# Patient Record
Sex: MALE | Race: WHITE | Employment: FULL TIME | URBAN - METROPOLITAN AREA
[De-identification: names, ages, dates, MRNs, and addresses within clinical notes are randomized per-mention and may not be internally consistent; named-entity substitution may affect disease eponyms.]

---

## 2018-08-08 LAB
CHOLESTEROL, TOTAL: 174 MG/DL
CHOLESTEROL/HDL RATIO: NORMAL
HDLC SERPL-MCNC: 48 MG/DL (ref 35–70)
LDL CHOLESTEROL CALCULATED: 106 MG/DL (ref 0–160)
TRIGL SERPL-MCNC: 101 MG/DL
VLDLC SERPL CALC-MCNC: 20 MG/DL

## 2019-08-20 RX ORDER — OMEPRAZOLE 40 MG/1
40 CAPSULE, DELAYED RELEASE ORAL DAILY
COMMUNITY
End: 2019-09-30 | Stop reason: SDUPTHER

## 2019-09-30 ENCOUNTER — OFFICE VISIT (OUTPATIENT)
Dept: FAMILY MEDICINE CLINIC | Age: 39
End: 2019-09-30
Payer: COMMERCIAL

## 2019-09-30 VITALS
WEIGHT: 249.2 LBS | DIASTOLIC BLOOD PRESSURE: 70 MMHG | BODY MASS INDEX: 34.89 KG/M2 | HEART RATE: 80 BPM | SYSTOLIC BLOOD PRESSURE: 115 MMHG | HEIGHT: 71 IN

## 2019-09-30 DIAGNOSIS — G43.809 OTHER MIGRAINE WITHOUT STATUS MIGRAINOSUS, NOT INTRACTABLE: ICD-10-CM

## 2019-09-30 DIAGNOSIS — K21.9 GASTROESOPHAGEAL REFLUX DISEASE WITHOUT ESOPHAGITIS: Primary | ICD-10-CM

## 2019-09-30 DIAGNOSIS — F41.9 ANXIETY: ICD-10-CM

## 2019-09-30 DIAGNOSIS — D18.01 ANGIOMA OF SKIN: ICD-10-CM

## 2019-09-30 PROBLEM — G43.909 MIGRAINE WITHOUT STATUS MIGRAINOSUS, NOT INTRACTABLE: Status: ACTIVE | Noted: 2019-09-30

## 2019-09-30 PROCEDURE — 99213 OFFICE O/P EST LOW 20 MIN: CPT | Performed by: FAMILY MEDICINE

## 2019-09-30 RX ORDER — OMEPRAZOLE 40 MG/1
40 CAPSULE, DELAYED RELEASE ORAL DAILY
Qty: 90 CAPSULE | Refills: 1 | Status: SHIPPED | OUTPATIENT
Start: 2019-09-30 | End: 2020-06-23 | Stop reason: SDUPTHER

## 2019-09-30 ASSESSMENT — ENCOUNTER SYMPTOMS
CHEST TIGHTNESS: 0
ABDOMINAL PAIN: 0
SHORTNESS OF BREATH: 0

## 2019-09-30 ASSESSMENT — PATIENT HEALTH QUESTIONNAIRE - PHQ9
SUM OF ALL RESPONSES TO PHQ QUESTIONS 1-9: 0
1. LITTLE INTEREST OR PLEASURE IN DOING THINGS: 0
2. FEELING DOWN, DEPRESSED OR HOPELESS: 0
SUM OF ALL RESPONSES TO PHQ QUESTIONS 1-9: 0
SUM OF ALL RESPONSES TO PHQ9 QUESTIONS 1 & 2: 0

## 2019-09-30 NOTE — PROGRESS NOTES
ALLERGIES  Allergies   Allergen Reactions    Bactrim [Sulfamethoxazole-Trimethoprim]     Sulfa Antibiotics Other (See Comments)     Doesn't recall reaction but had to be seen in ED       PHYSICAL EXAM    /70 (Site: Left Upper Arm, Position: Sitting, Cuff Size: Large Adult)   Pulse 80   Ht 5' 11\" (1.803 m)   Wt 249 lb 3.2 oz (113 kg)   BMI 34.76 kg/m²     Physical Exam   Constitutional: He is oriented to person, place, and time. He appears well-developed and well-nourished. HENT:   Head: Normocephalic and atraumatic. Eyes: Pupils are equal, round, and reactive to light. EOM are normal.   Neck: Normal range of motion. Neck supple. Cardiovascular: Normal rate, regular rhythm and normal heart sounds. Pulmonary/Chest: Effort normal and breath sounds normal.   Abdominal: Soft. Musculoskeletal: Normal range of motion. Neurological: He is alert and oriented to person, place, and time. Skin: Skin is warm and dry. With angioma left flank certainly benign-appearing stop bleeding I will think it likely to rub and I think does not need to be excised unless has symptoms he agrees with this approach   Psychiatric: He has a normal mood and affect. Thought content normal.   Nursing note and vitals reviewed. ASSESSMENT & PLAN     Diagnosis Orders   1. Gastroesophageal reflux disease without esophagitis     2. Anxiety     3. Other migraine without status migrainosus, not intractable     4. Angioma of skin     At this point we discussed situation no treatment needed simply provide refills on meds as he needs. Continue with healthy lifestyle watch weight and increase exercise. No antidepressant needed -migraine and ophthalmologic symptoms are stable. Continue to work on antireflux measures and take his PPI. We discussed a mole and no intervention at this point. Mole is really an angioma. Return in about 6 months (around 3/30/2020).          Electronically signed by Lee Ann Adan MD on

## 2020-03-24 ENCOUNTER — TELEPHONE (OUTPATIENT)
Dept: INTERNAL MEDICINE CLINIC | Age: 40
End: 2020-03-24

## 2020-06-23 ENCOUNTER — OFFICE VISIT (OUTPATIENT)
Dept: FAMILY MEDICINE CLINIC | Age: 40
End: 2020-06-23
Payer: COMMERCIAL

## 2020-06-23 VITALS
SYSTOLIC BLOOD PRESSURE: 122 MMHG | HEART RATE: 66 BPM | HEIGHT: 71 IN | OXYGEN SATURATION: 97 % | BODY MASS INDEX: 34.24 KG/M2 | TEMPERATURE: 97.8 F | WEIGHT: 244.6 LBS | DIASTOLIC BLOOD PRESSURE: 80 MMHG

## 2020-06-23 PROBLEM — F41.9 ANXIETY: Status: RESOLVED | Noted: 2019-09-30 | Resolved: 2020-06-23

## 2020-06-23 PROCEDURE — 99213 OFFICE O/P EST LOW 20 MIN: CPT | Performed by: PHYSICIAN ASSISTANT

## 2020-06-23 RX ORDER — OMEPRAZOLE 40 MG/1
40 CAPSULE, DELAYED RELEASE ORAL DAILY
Qty: 90 CAPSULE | Refills: 1 | Status: SHIPPED | OUTPATIENT
Start: 2020-06-23 | End: 2021-03-26 | Stop reason: SDUPTHER

## 2020-06-23 ASSESSMENT — ENCOUNTER SYMPTOMS
SORE THROAT: 0
NAUSEA: 0
EYE PAIN: 0
DIARRHEA: 0
COUGH: 0
ABDOMINAL PAIN: 0
VOMITING: 0
RHINORRHEA: 0
BLOOD IN STOOL: 0
SHORTNESS OF BREATH: 0
CONSTIPATION: 0

## 2020-06-23 ASSESSMENT — PATIENT HEALTH QUESTIONNAIRE - PHQ9
1. LITTLE INTEREST OR PLEASURE IN DOING THINGS: 0
SUM OF ALL RESPONSES TO PHQ9 QUESTIONS 1 & 2: 0
SUM OF ALL RESPONSES TO PHQ QUESTIONS 1-9: 0
SUM OF ALL RESPONSES TO PHQ QUESTIONS 1-9: 0
2. FEELING DOWN, DEPRESSED OR HOPELESS: 0

## 2020-06-23 NOTE — PROGRESS NOTES
Cardiovascular:      Rate and Rhythm: Normal rate and regular rhythm. Heart sounds: No murmur. No friction rub. No gallop. Pulmonary:      Effort: Pulmonary effort is normal.      Breath sounds: Normal breath sounds. Musculoskeletal:      Right lower leg: No edema. Left lower leg: No edema. Skin:     General: Skin is warm and dry. Neurological:      Mental Status: He is alert and oriented to person, place, and time. Comments: Cranial nerves II-XII grossly intact   Psychiatric:         Mood and Affect: Mood normal.         Behavior: Behavior normal.         ASSESSMENT & PLAN  1. Gastroesophageal reflux disease without esophagitis  Well controlled. Refilled medication. - omeprazole (PRILOSEC) 40 MG delayed release capsule; Take 1 capsule by mouth daily  Dispense: 90 capsule; Refill: 1          No follow-ups on file.             Electronically signed by Noel Feliciano PA-C on 6/23/2020

## 2021-03-26 ENCOUNTER — OFFICE VISIT (OUTPATIENT)
Dept: FAMILY MEDICINE CLINIC | Age: 41
End: 2021-03-26
Payer: COMMERCIAL

## 2021-03-26 VITALS
DIASTOLIC BLOOD PRESSURE: 92 MMHG | SYSTOLIC BLOOD PRESSURE: 134 MMHG | WEIGHT: 248 LBS | TEMPERATURE: 97.2 F | HEART RATE: 76 BPM | HEIGHT: 71 IN | BODY MASS INDEX: 34.72 KG/M2

## 2021-03-26 DIAGNOSIS — Z13.220 LIPID SCREENING: ICD-10-CM

## 2021-03-26 DIAGNOSIS — R39.15 URINARY URGENCY: ICD-10-CM

## 2021-03-26 DIAGNOSIS — Z00.00 WELL ADULT HEALTH CHECK: ICD-10-CM

## 2021-03-26 DIAGNOSIS — Z00.00 WELL ADULT HEALTH CHECK: Primary | ICD-10-CM

## 2021-03-26 DIAGNOSIS — K21.9 GASTROESOPHAGEAL REFLUX DISEASE WITHOUT ESOPHAGITIS: ICD-10-CM

## 2021-03-26 LAB
A/G RATIO: 1.6 (ref 1.1–2.2)
ALBUMIN SERPL-MCNC: 4.8 G/DL (ref 3.4–5)
ALP BLD-CCNC: 72 U/L (ref 40–129)
ALT SERPL-CCNC: 55 U/L (ref 10–40)
ANION GAP SERPL CALCULATED.3IONS-SCNC: 7 MMOL/L (ref 3–16)
AST SERPL-CCNC: 29 U/L (ref 15–37)
BILIRUB SERPL-MCNC: 0.4 MG/DL (ref 0–1)
BUN BLDV-MCNC: 11 MG/DL (ref 7–20)
CALCIUM SERPL-MCNC: 9.7 MG/DL (ref 8.3–10.6)
CHLORIDE BLD-SCNC: 102 MMOL/L (ref 99–110)
CHOLESTEROL, TOTAL: 181 MG/DL (ref 0–199)
CO2: 29 MMOL/L (ref 21–32)
CREAT SERPL-MCNC: 0.9 MG/DL (ref 0.9–1.3)
GFR AFRICAN AMERICAN: >60
GFR NON-AFRICAN AMERICAN: >60
GLOBULIN: 3 G/DL
GLUCOSE BLD-MCNC: 86 MG/DL (ref 70–99)
HCT VFR BLD CALC: 50.2 % (ref 40.5–52.5)
HDLC SERPL-MCNC: 43 MG/DL (ref 40–60)
HEMOGLOBIN: 16.8 G/DL (ref 13.5–17.5)
LDL CHOLESTEROL CALCULATED: 111 MG/DL
MCH RBC QN AUTO: 31.1 PG (ref 26–34)
MCHC RBC AUTO-ENTMCNC: 33.6 G/DL (ref 31–36)
MCV RBC AUTO: 92.5 FL (ref 80–100)
PDW BLD-RTO: 13.7 % (ref 12.4–15.4)
PLATELET # BLD: 262 K/UL (ref 135–450)
PMV BLD AUTO: 8.6 FL (ref 5–10.5)
POTASSIUM SERPL-SCNC: 4.6 MMOL/L (ref 3.5–5.1)
RBC # BLD: 5.42 M/UL (ref 4.2–5.9)
SODIUM BLD-SCNC: 138 MMOL/L (ref 136–145)
TOTAL PROTEIN: 7.8 G/DL (ref 6.4–8.2)
TRIGL SERPL-MCNC: 133 MG/DL (ref 0–150)
TSH REFLEX: 1.13 UIU/ML (ref 0.27–4.2)
VLDLC SERPL CALC-MCNC: 27 MG/DL
WBC # BLD: 7.7 K/UL (ref 4–11)

## 2021-03-26 PROCEDURE — 99396 PREV VISIT EST AGE 40-64: CPT | Performed by: FAMILY MEDICINE

## 2021-03-26 PROCEDURE — 81002 URINALYSIS NONAUTO W/O SCOPE: CPT | Performed by: FAMILY MEDICINE

## 2021-03-26 RX ORDER — OMEPRAZOLE 40 MG/1
40 CAPSULE, DELAYED RELEASE ORAL DAILY
Qty: 90 CAPSULE | Refills: 1 | Status: SHIPPED | OUTPATIENT
Start: 2021-03-26 | End: 2021-07-30 | Stop reason: SDUPTHER

## 2021-03-26 ASSESSMENT — ENCOUNTER SYMPTOMS
NAUSEA: 0
SHORTNESS OF BREATH: 0
TROUBLE SWALLOWING: 0
VOMITING: 0
BLOOD IN STOOL: 0
ABDOMINAL PAIN: 0
DIARRHEA: 0
WHEEZING: 0
EYE PAIN: 0
CHEST TIGHTNESS: 0

## 2021-03-26 ASSESSMENT — PATIENT HEALTH QUESTIONNAIRE - PHQ9
SUM OF ALL RESPONSES TO PHQ QUESTIONS 1-9: 0
1. LITTLE INTEREST OR PLEASURE IN DOING THINGS: 0
SUM OF ALL RESPONSES TO PHQ9 QUESTIONS 1 & 2: 0
SUM OF ALL RESPONSES TO PHQ QUESTIONS 1-9: 0

## 2021-03-26 NOTE — PROGRESS NOTES
3/26/2021    [facility]    Chief Complaint   Patient presents with    Annual Exam    Urinary Frequency     urinary frequency, bladder pressure, x1 month       HPI  History was obtained from the patient. Mazrena Mcpherson is a 35 y.o. male who presents today with routine physical for well check. States he needs to work on little bit of weight loss and increase in exercise otherwise been feeling fine GERD symptoms been okay with diet changes and medication. Migraine headaches have not been an issue. Only real issue is he is having some suprapubic pressure symptoms increased frequency no real dysuria no fever no chills. Denies other intercurrent issues or problems. Has not had Covid vaccine yet and is due for labs. [place] REVIEW OF SYMPTOMS    Review of Systems   Constitutional: Negative for activity change, fatigue and fever. HENT: Negative for congestion, hearing loss, mouth sores and trouble swallowing. Eyes: Negative for pain and visual disturbance. Respiratory: Negative for chest tightness, shortness of breath and wheezing. Cardiovascular: Negative for chest pain and palpitations. Gastrointestinal: Negative for abdominal pain, blood in stool, diarrhea, nausea and vomiting. Endocrine: Negative for cold intolerance, polydipsia and polyuria. Genitourinary: Positive for frequency. Negative for difficulty urinating, dysuria and urgency. Musculoskeletal: Negative for arthralgias, gait problem and neck stiffness. Skin: Negative for rash. Allergic/Immunologic: Negative for environmental allergies. Neurological: Positive for headaches. Negative for dizziness, seizures, speech difficulty and weakness. Hematological: Does not bruise/bleed easily. Psychiatric/Behavioral: Negative for agitation, confusion, hallucinations and suicidal ideas. The patient is not nervous/anxious.         PAST MEDICAL HISTORY  Past Medical History:   Diagnosis Date    GERD (gastroesophageal reflux disease)        FAMILY HISTORY  No family history on file. SOCIAL HISTORY  Social History     Socioeconomic History    Marital status:      Spouse name: None    Number of children: None    Years of education: None    Highest education level: None   Occupational History    None   Social Needs    Financial resource strain: None    Food insecurity     Worry: None     Inability: None    Transportation needs     Medical: None     Non-medical: None   Tobacco Use    Smoking status: Never Smoker    Smokeless tobacco: Never Used   Substance and Sexual Activity    Alcohol use: Yes    Drug use: Never    Sexual activity: None   Lifestyle    Physical activity     Days per week: None     Minutes per session: None    Stress: None   Relationships    Social connections     Talks on phone: None     Gets together: None     Attends Yarsanism service: None     Active member of club or organization: None     Attends meetings of clubs or organizations: None     Relationship status: None    Intimate partner violence     Fear of current or ex partner: None     Emotionally abused: None     Physically abused: None     Forced sexual activity: None   Other Topics Concern    None   Social History Narrative    None        SURGICAL HISTORY  Past Surgical History:   Procedure Laterality Date    VASECTOMY  2017                 CURRENT MEDICATIONS  Current Outpatient Medications   Medication Sig Dispense Refill    omeprazole (PRILOSEC) 40 MG delayed release capsule Take 1 capsule by mouth daily 90 capsule 1    mirabegron (MYRBETRIQ) 25 MG TB24 Take 1 tablet by mouth daily 30 tablet 1    Multiple Vitamins-Minerals (MULTIVITAMIN ADULT PO) Take by mouth       No current facility-administered medications for this visit.         ALLERGIES  Allergies   Allergen Reactions    Bactrim [Sulfamethoxazole-Trimethoprim]     Sulfa Antibiotics Other (See Comments)     Doesn't recall reaction but had to be seen in ED       PHYSICAL EXAM    BP (!) 134/92 Pulse 76   Temp 97.2 °F (36.2 °C)   Ht 5' 11\" (1.803 m)   Wt 248 lb (112.5 kg)   BMI 34.59 kg/m²     Physical Exam  Vitals signs and nursing note reviewed. Constitutional:       General: He is not in acute distress. Appearance: He is well-developed. He is not ill-appearing or toxic-appearing. HENT:      Head: Normocephalic and atraumatic. Nose: Nose normal.      Mouth/Throat:      Mouth: Mucous membranes are moist.      Pharynx: Oropharynx is clear. No oropharyngeal exudate. Eyes:      Pupils: Pupils are equal, round, and reactive to light. Neck:      Musculoskeletal: Normal range of motion and neck supple. Cardiovascular:      Rate and Rhythm: Normal rate and regular rhythm. Heart sounds: Normal heart sounds. Pulmonary:      Effort: Pulmonary effort is normal. No respiratory distress. Breath sounds: Normal breath sounds. No wheezing, rhonchi or rales. Abdominal:      Palpations: Abdomen is soft. Tenderness: There is no abdominal tenderness. Musculoskeletal: Normal range of motion. General: No swelling or deformity. Skin:     General: Skin is warm and dry. Neurological:      General: No focal deficit present. Mental Status: He is alert and oriented to person, place, and time. Mental status is at baseline. Cranial Nerves: No cranial nerve deficit. Motor: No weakness. Gait: Gait normal.   Psychiatric:         Mood and Affect: Mood normal.         Behavior: Behavior normal.         Thought Content: Thought content normal.       UA today was negative  ASSESSMENT & PLAN     Diagnosis Orders   1. Well adult health check  CBC    COMPREHENSIVE METABOLIC PANEL    TSH with Reflex   2. Gastroesophageal reflux disease without esophagitis  omeprazole (PRILOSEC) 40 MG delayed release capsule   3. Urinary urgency  POCT Urinalysis no Micro    CBC    COMPREHENSIVE METABOLIC PANEL   4. Lipid screening  LIPID PANEL   Discussion carried out.  Patient continue on low caffeine and low bladder irritant diet. Avoid food or liquids that aggravate his bladder symptoms. Drink plenty of clear liquids otherwise. He is advised to get Covid vaccine. We will check CMP, CBC, TSH, and lipid panel. Follow-up for these results- will provide refills on meds as needed and empirically cover him with Myrbetriq 25 mg daily and observe his bladder symptoms over the next 3 to 4 weeks- he is to let me know how this does for his urinary symptoms. Return if symptoms worsen or fail to improve.          Electronically signed by Morenita Hua MD on 3/26/2021

## 2021-07-30 ENCOUNTER — OFFICE VISIT (OUTPATIENT)
Dept: FAMILY MEDICINE CLINIC | Age: 41
End: 2021-07-30
Payer: COMMERCIAL

## 2021-07-30 VITALS
HEIGHT: 72 IN | RESPIRATION RATE: 22 BRPM | OXYGEN SATURATION: 98 % | WEIGHT: 245 LBS | DIASTOLIC BLOOD PRESSURE: 78 MMHG | SYSTOLIC BLOOD PRESSURE: 121 MMHG | HEART RATE: 62 BPM | BODY MASS INDEX: 33.18 KG/M2

## 2021-07-30 DIAGNOSIS — L98.9 SKIN LESION ON EXAMINATION: ICD-10-CM

## 2021-07-30 DIAGNOSIS — G43.809 OTHER MIGRAINE WITHOUT STATUS MIGRAINOSUS, NOT INTRACTABLE: ICD-10-CM

## 2021-07-30 DIAGNOSIS — R79.89 LFT ELEVATION: ICD-10-CM

## 2021-07-30 DIAGNOSIS — K21.9 GASTROESOPHAGEAL REFLUX DISEASE WITHOUT ESOPHAGITIS: Primary | ICD-10-CM

## 2021-07-30 LAB — ALT SERPL-CCNC: 69 U/L (ref 10–40)

## 2021-07-30 PROCEDURE — 99213 OFFICE O/P EST LOW 20 MIN: CPT | Performed by: FAMILY MEDICINE

## 2021-07-30 RX ORDER — SUMATRIPTAN 100 MG/1
100 TABLET, FILM COATED ORAL
Qty: 9 TABLET | Refills: 5 | Status: SHIPPED | OUTPATIENT
Start: 2021-07-30 | End: 2022-06-30

## 2021-07-30 RX ORDER — OMEPRAZOLE 40 MG/1
40 CAPSULE, DELAYED RELEASE ORAL DAILY
Qty: 90 CAPSULE | Refills: 1 | Status: SHIPPED | OUTPATIENT
Start: 2021-07-30 | End: 2022-04-05

## 2021-07-30 ASSESSMENT — ENCOUNTER SYMPTOMS
VOMITING: 0
EYE PAIN: 0
CHEST TIGHTNESS: 0
SHORTNESS OF BREATH: 0
BLOOD IN STOOL: 0
DIARRHEA: 0
TROUBLE SWALLOWING: 0
WHEEZING: 0
ABDOMINAL PAIN: 0
NAUSEA: 0

## 2021-07-30 NOTE — PROGRESS NOTES
7/30/2021    [facility]    Chief Complaint   Patient presents with    Mole     left side mole        HPI  History was obtained from the patient. Irma Reece is a 35 y.o. male who presents today with follow-up on migraine headaches and GE reflux symptoms. Overall GERD symptoms are controlled but he watches his diet at all and takes his omeprazole. Still having significant migraine headaches although they do not knock him out of commission completely. No focalized neurologic deficits reported. He remains active he had labs in March of this year that were all normal except for mild elevation of ALT. He has had his Covid virus vaccination x2 with [facility]. Erum Almaraz REVIEW OF SYMPTOMS    Review of Systems   Constitutional: Negative for activity change and fatigue. HENT: Negative for congestion, hearing loss, mouth sores and trouble swallowing. Eyes: Negative for pain and visual disturbance. Respiratory: Negative for chest tightness, shortness of breath and wheezing. Cardiovascular: Negative for chest pain and palpitations. Gastrointestinal: Negative for abdominal pain, blood in stool, diarrhea, nausea and vomiting. Genitourinary: Negative for dysuria, frequency and urgency. Musculoskeletal: Negative for arthralgias, gait problem and neck stiffness. Skin: Negative for rash. Patient with slightly irritated red skin nodule lower back probably slightly irritated angioma or skin tag. Certainly appears benign. He would like something done with this and he is due for general skin exam with dermatology. Allergic/Immunologic: Negative for environmental allergies. Neurological: Negative for dizziness, seizures, speech difficulty and weakness. Hematological: Does not bruise/bleed easily. Psychiatric/Behavioral: Negative for agitation, confusion and hallucinations.        PAST MEDICAL HISTORY  Past Medical History:   Diagnosis Date    GERD (gastroesophageal reflux disease)        FAMILY HISTORY  No family history on file. SOCIAL HISTORY  Social History     Socioeconomic History    Marital status:      Spouse name: Not on file    Number of children: Not on file    Years of education: Not on file    Highest education level: Not on file   Occupational History    Not on file   Tobacco Use    Smoking status: Never Smoker    Smokeless tobacco: Never Used   Substance and Sexual Activity    Alcohol use: Yes    Drug use: Never    Sexual activity: Not on file   Other Topics Concern    Not on file   Social History Narrative    Not on file     Social Determinants of Health     Financial Resource Strain:     Difficulty of Paying Living Expenses:    Food Insecurity:     Worried About Running Out of Food in the Last Year:     [address] in the Last Year:    Transportation Needs:     Lack of Transportation (Medical):      Lack of Transportation (Non-Medical):    Physical Activity:     Days of Exercise per Week:     Minutes of Exercise per Session:    Stress:     Feeling of Stress :    Social Connections:     Frequency of Communication with Friends and Family:     Frequency of Social Gatherings with Friends and Family:     Attends Anabaptist Services:     Active Member of Clubs or Organizations:     Attends Club or Organization Meetings:     Marital Status:    Intimate Partner Violence:     Fear of Current or Ex-Partner:     Emotionally Abused:     Physically Abused:     Sexually Abused:         SURGICAL HISTORY  Past Surgical History:   Procedure Laterality Date    VASECTOMY  2017                 CURRENT MEDICATIONS  Current Outpatient Medications   Medication Sig Dispense Refill    SUMAtriptan (IMITREX) 100 MG tablet Take 1 tablet by mouth once as needed for Migraine 9 tablet 5    omeprazole (PRILOSEC) 40 MG delayed release capsule Take 1 capsule by mouth daily 90 capsule 1    Multiple Vitamins-Minerals (MULTIVITAMIN ADULT PO) Take by mouth       No current facility-administered medications for this visit. ALLERGIES  Allergies   Allergen Reactions    Bactrim [Sulfamethoxazole-Trimethoprim]     Sulfa Antibiotics Other (See Comments)     Doesn't recall reaction but had to be seen in ED       PHYSICAL EXAM    /78   Pulse 62   Resp 22   Ht 5' 11.5\" (1.816 m)   Wt 245 lb (111.1 kg)   SpO2 98%   BMI 33.69 kg/m²     Physical Exam  Vitals and nursing note reviewed. Constitutional:       General: He is not in acute distress. Appearance: Normal appearance. He is well-developed. He is not ill-appearing or toxic-appearing. HENT:      Head: Normocephalic and atraumatic. Nose: Nose normal.      Mouth/Throat:      Mouth: Mucous membranes are moist.   Eyes:      Pupils: Pupils are equal, round, and reactive to light. Cardiovascular:      Rate and Rhythm: Regular rhythm. Bradycardia present. Pulses: Normal pulses. Heart sounds: Normal heart sounds. No murmur heard. Pulmonary:      Effort: Pulmonary effort is normal. No respiratory distress. Breath sounds: Normal breath sounds. No wheezing or rales. Abdominal:      Palpations: Abdomen is soft. Musculoskeletal:         General: No deformity or signs of injury. Normal range of motion. Cervical back: Normal range of motion and neck supple. No rigidity. Skin:     General: Skin is warm and dry. Coloration: Skin is not jaundiced. Findings: No bruising. Neurological:      Mental Status: He is alert and oriented to person, place, and time. Mental status is at baseline. Cranial Nerves: No cranial nerve deficit. Motor: No weakness. Gait: Gait normal.   Psychiatric:         Mood and Affect: Mood normal.         Behavior: Behavior normal.         Thought Content: Thought content normal.         ASSESSMENT & PLAN     Diagnosis Orders   1. Gastroesophageal reflux disease without esophagitis  omeprazole (PRILOSEC) 40 MG delayed release capsule   2.  Other migraine without status migrainosus, not intractable     3. LFT elevation  ALT   4. Skin lesion on examination  External Referral To Dermatology   Will make referral to [facility] for skin exam.  We will check an ALT today as had previously been a little bit elevated. He is to work on increase exercise and some moderate weight loss with about 18 to 20 pounds as of initial goal.  Refills on regular meds to be provided. He is to call with questions or problems    Return in about 4 months (around 11/30/2021).          Electronically signed by Lalit Ramirez MD on 7/30/2021

## 2022-04-05 DIAGNOSIS — K21.9 GASTROESOPHAGEAL REFLUX DISEASE WITHOUT ESOPHAGITIS: ICD-10-CM

## 2022-04-05 RX ORDER — OMEPRAZOLE 40 MG/1
CAPSULE, DELAYED RELEASE ORAL
Qty: 90 CAPSULE | Refills: 0 | Status: SHIPPED | OUTPATIENT
Start: 2022-04-05 | End: 2022-06-30 | Stop reason: SDUPTHER

## 2022-06-30 ENCOUNTER — OFFICE VISIT (OUTPATIENT)
Dept: FAMILY MEDICINE CLINIC | Age: 42
End: 2022-06-30
Payer: COMMERCIAL

## 2022-06-30 ENCOUNTER — HOSPITAL ENCOUNTER (OUTPATIENT)
Age: 42
Discharge: HOME | End: 2022-06-30
Payer: COMMERCIAL

## 2022-06-30 ENCOUNTER — HOSPITAL ENCOUNTER (OUTPATIENT)
Dept: GENERAL RADIOLOGY | Age: 42
Discharge: HOME | End: 2022-06-30
Payer: COMMERCIAL

## 2022-06-30 VITALS
RESPIRATION RATE: 22 BRPM | HEIGHT: 72 IN | SYSTOLIC BLOOD PRESSURE: 154 MMHG | WEIGHT: 253.9 LBS | BODY MASS INDEX: 34.39 KG/M2 | OXYGEN SATURATION: 97 % | DIASTOLIC BLOOD PRESSURE: 105 MMHG | HEART RATE: 68 BPM

## 2022-06-30 DIAGNOSIS — I10 PRIMARY HYPERTENSION: ICD-10-CM

## 2022-06-30 DIAGNOSIS — F41.9 ANXIETY: ICD-10-CM

## 2022-06-30 DIAGNOSIS — R05.9 COUGH: ICD-10-CM

## 2022-06-30 DIAGNOSIS — K21.9 GASTROESOPHAGEAL REFLUX DISEASE WITHOUT ESOPHAGITIS: ICD-10-CM

## 2022-06-30 DIAGNOSIS — R05.9 COUGH: Primary | ICD-10-CM

## 2022-06-30 DIAGNOSIS — E66.09 CLASS 1 OBESITY DUE TO EXCESS CALORIES WITH SERIOUS COMORBIDITY AND BODY MASS INDEX (BMI) OF 34.0 TO 34.9 IN ADULT: ICD-10-CM

## 2022-06-30 DIAGNOSIS — G43.809 OTHER MIGRAINE WITHOUT STATUS MIGRAINOSUS, NOT INTRACTABLE: ICD-10-CM

## 2022-06-30 PROBLEM — E66.811 CLASS 1 OBESITY DUE TO EXCESS CALORIES WITH SERIOUS COMORBIDITY AND BODY MASS INDEX (BMI) OF 34.0 TO 34.9 IN ADULT: Status: ACTIVE | Noted: 2022-06-30

## 2022-06-30 LAB
A/G RATIO: 1.5 (ref 1.1–2.2)
ALBUMIN SERPL-MCNC: 4.4 G/DL (ref 3.4–5)
ALP BLD-CCNC: 77 U/L (ref 40–129)
ALT SERPL-CCNC: 109 U/L (ref 10–40)
ANION GAP SERPL CALCULATED.3IONS-SCNC: 11 MMOL/L (ref 3–16)
AST SERPL-CCNC: 50 U/L (ref 15–37)
BILIRUB SERPL-MCNC: 0.4 MG/DL (ref 0–1)
BUN BLDV-MCNC: 13 MG/DL (ref 7–20)
CALCIUM SERPL-MCNC: 9.8 MG/DL (ref 8.3–10.6)
CHLORIDE BLD-SCNC: 105 MMOL/L (ref 99–110)
CO2: 24 MMOL/L (ref 21–32)
CREAT SERPL-MCNC: 1 MG/DL (ref 0.9–1.3)
GFR AFRICAN AMERICAN: >60
GFR NON-AFRICAN AMERICAN: >60
GLUCOSE BLD-MCNC: 104 MG/DL (ref 70–99)
HCT VFR BLD CALC: 46.3 % (ref 40.5–52.5)
HEMOGLOBIN: 15.8 G/DL (ref 13.5–17.5)
MCH RBC QN AUTO: 30.9 PG (ref 26–34)
MCHC RBC AUTO-ENTMCNC: 34.1 G/DL (ref 31–36)
MCV RBC AUTO: 90.6 FL (ref 80–100)
PDW BLD-RTO: 13.3 % (ref 12.4–15.4)
PLATELET # BLD: 218 K/UL (ref 135–450)
PMV BLD AUTO: 8.4 FL (ref 5–10.5)
POTASSIUM SERPL-SCNC: 5 MMOL/L (ref 3.5–5.1)
RBC # BLD: 5.11 M/UL (ref 4.2–5.9)
SODIUM BLD-SCNC: 140 MMOL/L (ref 136–145)
TOTAL PROTEIN: 7.3 G/DL (ref 6.4–8.2)
WBC # BLD: 6.1 K/UL (ref 4–11)

## 2022-06-30 PROCEDURE — 71046 X-RAY EXAM CHEST 2 VIEWS: CPT

## 2022-06-30 PROCEDURE — 99214 OFFICE O/P EST MOD 30 MIN: CPT | Performed by: FAMILY MEDICINE

## 2022-06-30 RX ORDER — BUSPIRONE HYDROCHLORIDE 5 MG/1
5 TABLET ORAL 2 TIMES DAILY
Qty: 60 TABLET | Refills: 2 | Status: SHIPPED | OUTPATIENT
Start: 2022-06-30 | End: 2022-07-30

## 2022-06-30 RX ORDER — OMEPRAZOLE 40 MG/1
CAPSULE, DELAYED RELEASE ORAL
Qty: 90 CAPSULE | Refills: 0 | Status: SHIPPED | OUTPATIENT
Start: 2022-06-30 | End: 2022-08-11 | Stop reason: SDUPTHER

## 2022-06-30 SDOH — ECONOMIC STABILITY: FOOD INSECURITY: WITHIN THE PAST 12 MONTHS, YOU WORRIED THAT YOUR FOOD WOULD RUN OUT BEFORE YOU GOT MONEY TO BUY MORE.: NEVER TRUE

## 2022-06-30 SDOH — ECONOMIC STABILITY: FOOD INSECURITY: WITHIN THE PAST 12 MONTHS, THE FOOD YOU BOUGHT JUST DIDN'T LAST AND YOU DIDN'T HAVE MONEY TO GET MORE.: NEVER TRUE

## 2022-06-30 ASSESSMENT — ENCOUNTER SYMPTOMS
WHEEZING: 0
ABDOMINAL PAIN: 0
NAUSEA: 0
BLOOD IN STOOL: 0
DIARRHEA: 0
SHORTNESS OF BREATH: 0
CHEST TIGHTNESS: 0
VOMITING: 0
EYE PAIN: 0
TROUBLE SWALLOWING: 0
COUGH: 1

## 2022-06-30 ASSESSMENT — PATIENT HEALTH QUESTIONNAIRE - PHQ9
SUM OF ALL RESPONSES TO PHQ9 QUESTIONS 1 & 2: 1
2. FEELING DOWN, DEPRESSED OR HOPELESS: 0
1. LITTLE INTEREST OR PLEASURE IN DOING THINGS: 1
SUM OF ALL RESPONSES TO PHQ QUESTIONS 1-9: 1

## 2022-06-30 ASSESSMENT — SOCIAL DETERMINANTS OF HEALTH (SDOH): HOW HARD IS IT FOR YOU TO PAY FOR THE VERY BASICS LIKE FOOD, HOUSING, MEDICAL CARE, AND HEATING?: NOT HARD AT ALL
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
## 2022-07-01 DIAGNOSIS — R79.89 LFT ELEVATION: Primary | ICD-10-CM

## 2022-08-11 ENCOUNTER — OFFICE VISIT (OUTPATIENT)
Dept: FAMILY MEDICINE CLINIC | Age: 42
End: 2022-08-11
Payer: COMMERCIAL

## 2022-08-11 VITALS
BODY MASS INDEX: 34.08 KG/M2 | HEART RATE: 58 BPM | DIASTOLIC BLOOD PRESSURE: 90 MMHG | WEIGHT: 251.6 LBS | OXYGEN SATURATION: 96 % | SYSTOLIC BLOOD PRESSURE: 136 MMHG | HEIGHT: 72 IN

## 2022-08-11 DIAGNOSIS — E66.09 CLASS 1 OBESITY DUE TO EXCESS CALORIES WITH SERIOUS COMORBIDITY AND BODY MASS INDEX (BMI) OF 34.0 TO 34.9 IN ADULT: ICD-10-CM

## 2022-08-11 DIAGNOSIS — R79.89 LFT ELEVATION: ICD-10-CM

## 2022-08-11 DIAGNOSIS — F41.9 ANXIETY: Primary | ICD-10-CM

## 2022-08-11 DIAGNOSIS — K21.9 GASTROESOPHAGEAL REFLUX DISEASE WITHOUT ESOPHAGITIS: ICD-10-CM

## 2022-08-11 PROCEDURE — 99213 OFFICE O/P EST LOW 20 MIN: CPT | Performed by: FAMILY MEDICINE

## 2022-08-11 RX ORDER — BUSPIRONE HYDROCHLORIDE 5 MG/1
5 TABLET ORAL 2 TIMES DAILY
COMMUNITY

## 2022-08-11 RX ORDER — OMEPRAZOLE 40 MG/1
CAPSULE, DELAYED RELEASE ORAL
Qty: 90 CAPSULE | Refills: 1 | Status: SHIPPED | OUTPATIENT
Start: 2022-08-11

## 2022-08-11 ASSESSMENT — ENCOUNTER SYMPTOMS
WHEEZING: 0
NAUSEA: 0
DIARRHEA: 0
EYE PAIN: 0
VOMITING: 0
SHORTNESS OF BREATH: 0
TROUBLE SWALLOWING: 0
CHEST TIGHTNESS: 0
BLOOD IN STOOL: 0
ABDOMINAL PAIN: 0

## 2022-09-22 ENCOUNTER — TELEPHONE (OUTPATIENT)
Dept: FAMILY MEDICINE CLINIC | Age: 42
End: 2022-09-22

## 2022-09-22 NOTE — TELEPHONE ENCOUNTER
PT STATED BP WAS UP A LITTLE AT VISIT 8/11. HE HAS CHECKED IT A FEW TIMES SINCE 158/110, 139/94. PT WILL BE BACK IN [place] TOMORROW.  PLEASE ADVISE

## 2022-09-23 ENCOUNTER — TELEPHONE (OUTPATIENT)
Dept: FAMILY MEDICINE CLINIC | Age: 42
End: 2022-09-23

## 2022-09-23 RX ORDER — LISINOPRIL 20 MG/1
20 TABLET ORAL DAILY
Qty: 30 TABLET | Refills: 1 | Status: SHIPPED | OUTPATIENT
Start: 2022-09-23

## 2022-09-23 NOTE — TELEPHONE ENCOUNTER
CALLED PT PER DR. Martha Craven, PT IS VERY HESITANT TO START ON BP MEDS , PT STATES HE WILL  LISINOPRIL BUT ONLY START IF HIS BP CONTINUES TO BE ELEVATED , PT TO CONTINUE TO MONITOR DAILY , WILL CALL TO SCHEDULE F/U , UNDERSTANDING VOICED

## 2022-09-23 NOTE — TELEPHONE ENCOUNTER
Have him continue to work on low-salt diet, mild weight loss, as minimization of stress as possible. Add lisinopril 20 mg 1 daily give 30 with 1 refill- continue to monitor pressures at home. Medication will take a couple weeks to kick in and have a follow-up appointment in about 4 to 5 weeks.

## 2023-05-28 DIAGNOSIS — K21.9 GASTROESOPHAGEAL REFLUX DISEASE WITHOUT ESOPHAGITIS: ICD-10-CM

## 2023-05-30 RX ORDER — OMEPRAZOLE 40 MG/1
CAPSULE, DELAYED RELEASE ORAL
Qty: 90 CAPSULE | Refills: 1 | OUTPATIENT
Start: 2023-05-30

## 2023-12-12 ENCOUNTER — OFFICE VISIT (OUTPATIENT)
Dept: FAMILY MEDICINE CLINIC | Age: 43
End: 2023-12-12
Payer: COMMERCIAL

## 2023-12-12 VITALS
HEART RATE: 91 BPM | BODY MASS INDEX: 34.69 KG/M2 | OXYGEN SATURATION: 98 % | SYSTOLIC BLOOD PRESSURE: 148 MMHG | HEIGHT: 72 IN | WEIGHT: 256.1 LBS | DIASTOLIC BLOOD PRESSURE: 98 MMHG | RESPIRATION RATE: 14 BRPM

## 2023-12-12 DIAGNOSIS — K21.9 GASTROESOPHAGEAL REFLUX DISEASE WITHOUT ESOPHAGITIS: ICD-10-CM

## 2023-12-12 DIAGNOSIS — J06.9 URI WITH COUGH AND CONGESTION: Primary | ICD-10-CM

## 2023-12-12 LAB
BILIRUBIN, URINE: NEGATIVE
BLOOD, URINE: NEGATIVE
CLARITY: NORMAL
COLOR: NORMAL
GLUCOSE URINE: NEGATIVE
KETONES, URINE: NEGATIVE
LEUKOCYTE ESTERASE, URINE: NORMAL
NITRITE, URINE: NEGATIVE
PH UA: NORMAL
PROTEIN UA: NORMAL
SPECIFIC GRAVITY, URINE: 1.02
UROBILINOGEN, URINE: NORMAL

## 2023-12-12 PROCEDURE — 99214 OFFICE O/P EST MOD 30 MIN: CPT | Performed by: PHYSICIAN ASSISTANT

## 2023-12-12 RX ORDER — METHYLPREDNISOLONE 4 MG/1
TABLET ORAL
Qty: 1 KIT | Refills: 0 | Status: SHIPPED | OUTPATIENT
Start: 2023-12-12

## 2023-12-12 RX ORDER — OMEPRAZOLE 40 MG/1
CAPSULE, DELAYED RELEASE ORAL
Qty: 90 CAPSULE | Refills: 1 | Status: SHIPPED | OUTPATIENT
Start: 2023-12-12

## 2023-12-12 SDOH — ECONOMIC STABILITY: FOOD INSECURITY: WITHIN THE PAST 12 MONTHS, YOU WORRIED THAT YOUR FOOD WOULD RUN OUT BEFORE YOU GOT MONEY TO BUY MORE.: NEVER TRUE

## 2023-12-12 SDOH — ECONOMIC STABILITY: FOOD INSECURITY: WITHIN THE PAST 12 MONTHS, THE FOOD YOU BOUGHT JUST DIDN'T LAST AND YOU DIDN'T HAVE MONEY TO GET MORE.: NEVER TRUE

## 2023-12-12 SDOH — ECONOMIC STABILITY: INCOME INSECURITY: HOW HARD IS IT FOR YOU TO PAY FOR THE VERY BASICS LIKE FOOD, HOUSING, MEDICAL CARE, AND HEATING?: NOT HARD AT ALL

## 2023-12-12 SDOH — ECONOMIC STABILITY: HOUSING INSECURITY
IN THE LAST 12 MONTHS, WAS THERE A TIME WHEN YOU DID NOT HAVE A STEADY PLACE TO SLEEP OR SLEPT IN A SHELTER (INCLUDING NOW)?: NO

## 2023-12-12 ASSESSMENT — PATIENT HEALTH QUESTIONNAIRE - PHQ9
SUM OF ALL RESPONSES TO PHQ QUESTIONS 1-9: 0
SUM OF ALL RESPONSES TO PHQ9 QUESTIONS 1 & 2: 0
1. LITTLE INTEREST OR PLEASURE IN DOING THINGS: 0
SUM OF ALL RESPONSES TO PHQ QUESTIONS 1-9: 0
2. FEELING DOWN, DEPRESSED OR HOPELESS: 0
SUM OF ALL RESPONSES TO PHQ QUESTIONS 1-9: 0
SUM OF ALL RESPONSES TO PHQ QUESTIONS 1-9: 0

## 2023-12-13 ENCOUNTER — TELEPHONE (OUTPATIENT)
Dept: FAMILY MEDICINE CLINIC | Age: 43
End: 2023-12-13

## 2023-12-13 DIAGNOSIS — U07.1 COVID-19: Primary | ICD-10-CM

## 2023-12-13 LAB — SARS-COV-2 N GENE RESP QL NAA+PROBE: DETECTED

## 2023-12-13 NOTE — TELEPHONE ENCOUNTER
I informed pt that his Covid Test was positive Per Dr Herrera Floor note. Pt would like the Paxlovid sent to [facility] [place] [facility] [address].

## 2024-01-16 ENCOUNTER — OFFICE VISIT (OUTPATIENT)
Dept: FAMILY MEDICINE CLINIC | Age: 44
End: 2024-01-16
Payer: COMMERCIAL

## 2024-01-16 VITALS
SYSTOLIC BLOOD PRESSURE: 148 MMHG | RESPIRATION RATE: 12 BRPM | BODY MASS INDEX: 35.47 KG/M2 | HEIGHT: 72 IN | DIASTOLIC BLOOD PRESSURE: 86 MMHG | HEART RATE: 91 BPM | WEIGHT: 261.9 LBS | OXYGEN SATURATION: 94 %

## 2024-01-16 DIAGNOSIS — G43.809 OTHER MIGRAINE WITHOUT STATUS MIGRAINOSUS, NOT INTRACTABLE: ICD-10-CM

## 2024-01-16 DIAGNOSIS — Z00.00 WELL ADULT HEALTH CHECK: ICD-10-CM

## 2024-01-16 DIAGNOSIS — F41.9 ANXIETY: ICD-10-CM

## 2024-01-16 DIAGNOSIS — K21.9 GASTROESOPHAGEAL REFLUX DISEASE WITHOUT ESOPHAGITIS: ICD-10-CM

## 2024-01-16 DIAGNOSIS — Z00.00 WELL ADULT HEALTH CHECK: Primary | ICD-10-CM

## 2024-01-16 DIAGNOSIS — E66.09 CLASS 1 OBESITY DUE TO EXCESS CALORIES WITH SERIOUS COMORBIDITY AND BODY MASS INDEX (BMI) OF 34.0 TO 34.9 IN ADULT: ICD-10-CM

## 2024-01-16 PROCEDURE — 99396 PREV VISIT EST AGE 40-64: CPT | Performed by: FAMILY MEDICINE

## 2024-01-16 RX ORDER — OMEPRAZOLE 40 MG/1
CAPSULE, DELAYED RELEASE ORAL
Qty: 90 CAPSULE | Refills: 1 | Status: SHIPPED | OUTPATIENT
Start: 2024-01-16

## 2024-01-16 ASSESSMENT — PATIENT HEALTH QUESTIONNAIRE - PHQ9
SUM OF ALL RESPONSES TO PHQ QUESTIONS 1-9: 0
SUM OF ALL RESPONSES TO PHQ QUESTIONS 1-9: 0
SUM OF ALL RESPONSES TO PHQ9 QUESTIONS 1 & 2: 0
2. FEELING DOWN, DEPRESSED OR HOPELESS: 0
SUM OF ALL RESPONSES TO PHQ QUESTIONS 1-9: 0
1. LITTLE INTEREST OR PLEASURE IN DOING THINGS: 0
SUM OF ALL RESPONSES TO PHQ QUESTIONS 1-9: 0

## 2024-01-16 ASSESSMENT — ENCOUNTER SYMPTOMS
EYE PAIN: 0
SHORTNESS OF BREATH: 0
VOMITING: 0
DIARRHEA: 0
WHEEZING: 0
TROUBLE SWALLOWING: 0
BLOOD IN STOOL: 0
NAUSEA: 0
ABDOMINAL PAIN: 0
CHEST TIGHTNESS: 0

## 2024-01-16 NOTE — PROGRESS NOTES
PHYSICAL EXAM    BP (!) 148/86 (Site: Left Upper Arm, Position: Sitting, Cuff Size: Medium Adult)   Pulse 91   Resp 12   Ht 1.816 m (5' 11.5\")   Wt 118.8 kg (261 lb 14.4 oz)   SpO2 94%   BMI 36.02 kg/m²     Physical Exam  Vitals and nursing note reviewed.   Constitutional:       General: He is not in acute distress.     Appearance: He is well-developed. He is not ill-appearing, toxic-appearing or diaphoretic.   HENT:      Head: Normocephalic and atraumatic.      Nose: Nose normal.      Mouth/Throat:      Mouth: Mucous membranes are moist.      Pharynx: Oropharynx is clear. No oropharyngeal exudate.   Eyes:      Pupils: Pupils are equal, round, and reactive to light.   Cardiovascular:      Rate and Rhythm: Normal rate and regular rhythm.      Heart sounds: Normal heart sounds. No murmur heard.     No gallop.   Pulmonary:      Effort: Pulmonary effort is normal. No respiratory distress.      Breath sounds: Normal breath sounds. No wheezing, rhonchi or rales.   Abdominal:      Palpations: Abdomen is soft.   Musculoskeletal:         General: No swelling or deformity. Normal range of motion.      Cervical back: Normal range of motion and neck supple. No rigidity.   Lymphadenopathy:      Cervical: No cervical adenopathy.   Skin:     General: Skin is warm and dry.      Coloration: Skin is not jaundiced.      Findings: No bruising.   Neurological:      General: No focal deficit present.      Mental Status: He is alert and oriented to person, place, and time. Mental status is at baseline.      Cranial Nerves: No cranial nerve deficit.      Motor: No weakness.   Psychiatric:         Mood and Affect: Mood normal.         Behavior: Behavior normal.         Thought Content: Thought content normal.         ASSESSMENT & PLAN     Diagnosis Orders   1. Well adult health check  CBC    Comprehensive Metabolic Panel    LIPID PANEL      2. Gastroesophageal reflux disease without esophagitis  omeprazole (PRILOSEC) 40 MG

## 2024-01-17 LAB
ALBUMIN SERPL-MCNC: 4.3 G/DL (ref 3.4–5)
ALBUMIN/GLOB SERPL: 1.3 {RATIO} (ref 1.1–2.2)
ALP SERPL-CCNC: 89 U/L (ref 40–129)
ALT SERPL-CCNC: 77 U/L (ref 10–40)
ANION GAP SERPL CALCULATED.3IONS-SCNC: 13 MMOL/L (ref 3–16)
AST SERPL-CCNC: 40 U/L (ref 15–37)
BILIRUB SERPL-MCNC: 0.3 MG/DL (ref 0–1)
BUN SERPL-MCNC: 13 MG/DL (ref 7–20)
CALCIUM SERPL-MCNC: 9.4 MG/DL (ref 8.3–10.6)
CHLORIDE SERPL-SCNC: 104 MMOL/L (ref 99–110)
CHOLEST SERPL-MCNC: 187 MG/DL (ref 0–199)
CO2 SERPL-SCNC: 25 MMOL/L (ref 21–32)
CREAT SERPL-MCNC: 0.8 MG/DL (ref 0.9–1.3)
DEPRECATED RDW RBC AUTO: 13.5 % (ref 12.4–15.4)
GFR SERPLBLD CREATININE-BSD FMLA CKD-EPI: >60 ML/MIN/{1.73_M2}
GLUCOSE SERPL-MCNC: 75 MG/DL (ref 70–99)
HCT VFR BLD AUTO: 47.1 % (ref 40.5–52.5)
HDLC SERPL-MCNC: 44 MG/DL (ref 40–60)
HGB BLD-MCNC: 16.1 G/DL (ref 13.5–17.5)
LDLC SERPL CALC-MCNC: 103 MG/DL
MCH RBC QN AUTO: 30.6 PG (ref 26–34)
MCHC RBC AUTO-ENTMCNC: 34.1 G/DL (ref 31–36)
MCV RBC AUTO: 89.6 FL (ref 80–100)
PLATELET # BLD AUTO: 279 K/UL (ref 135–450)
PMV BLD AUTO: 8.5 FL (ref 5–10.5)
POTASSIUM SERPL-SCNC: 4.5 MMOL/L (ref 3.5–5.1)
PROT SERPL-MCNC: 7.5 G/DL (ref 6.4–8.2)
RBC # BLD AUTO: 5.25 M/UL (ref 4.2–5.9)
SODIUM SERPL-SCNC: 142 MMOL/L (ref 136–145)
TRIGL SERPL-MCNC: 202 MG/DL (ref 0–150)
VLDLC SERPL CALC-MCNC: 40 MG/DL
WBC # BLD AUTO: 8.2 K/UL (ref 4–11)

## 2024-01-18 ENCOUNTER — TELEPHONE (OUTPATIENT)
Dept: FAMILY MEDICINE CLINIC | Age: 44
End: 2024-01-18

## 2024-01-18 DIAGNOSIS — R79.89 LFT ELEVATION: Primary | ICD-10-CM

## 2024-01-18 NOTE — TELEPHONE ENCOUNTER
Orders placed. Spoke to patient per result note.   Left detailed message that orders were placed and he can get blood work done here or at the imaging building. Instructed to call with any questions.

## 2024-01-18 NOTE — TELEPHONE ENCOUNTER
----- Message from Eran Schultz MD sent at 1/17/2024  7:17 AM EST -----  Please inform patient that CMP looks good except for continued mild liver enzyme elevation.  Lipids are fairly stable with slight triglyceride elevation but minimal.  CBC is normal.  To follow-up on this liver enzyme elevation lets set up acute hepatitis panel and set up liver ultrasound.  In the meantime avoid overuse of Tylenol and avoid heavy alcohol intake.

## 2024-01-30 ENCOUNTER — HOSPITAL ENCOUNTER (OUTPATIENT)
Dept: ULTRASOUND IMAGING | Age: 44
Discharge: HOME | End: 2024-01-30
Payer: COMMERCIAL

## 2024-01-30 DIAGNOSIS — N28.89 RIGHT KIDNEY MASS: Primary | ICD-10-CM

## 2024-01-30 DIAGNOSIS — R79.89 LFT ELEVATION: ICD-10-CM

## 2024-01-30 PROCEDURE — 76705 ECHO EXAM OF ABDOMEN: CPT

## 2024-02-16 ENCOUNTER — TELEPHONE (OUTPATIENT)
Dept: FAMILY MEDICINE CLINIC | Age: 44
End: 2024-02-16

## 2024-02-16 ENCOUNTER — HOSPITAL ENCOUNTER (OUTPATIENT)
Dept: CT IMAGING | Age: 44
Discharge: HOME | End: 2024-02-16
Payer: COMMERCIAL

## 2024-02-16 DIAGNOSIS — N28.89 RIGHT KIDNEY MASS: ICD-10-CM

## 2024-02-16 PROCEDURE — 6360000004 HC RX CONTRAST MEDICATION: Performed by: FAMILY MEDICINE

## 2024-02-16 PROCEDURE — 2580000003 HC RX 258: Performed by: FAMILY MEDICINE

## 2024-02-16 PROCEDURE — 74170 CT ABD WO CNTRST FLWD CNTRST: CPT

## 2024-02-16 RX ORDER — SODIUM CHLORIDE 9 MG/ML
10 INJECTION INTRAVENOUS PRN
Status: COMPLETED | OUTPATIENT
Start: 2024-02-16 | End: 2024-02-16

## 2024-02-16 RX ADMIN — SODIUM CHLORIDE, PRESERVATIVE FREE 10 ML: 5 INJECTION INTRAVENOUS at 08:10

## 2024-02-16 RX ADMIN — IOPAMIDOL 75 ML: 755 INJECTION, SOLUTION INTRAVENOUS at 08:10

## 2024-02-20 ENCOUNTER — TELEPHONE (OUTPATIENT)
Dept: FAMILY MEDICINE CLINIC | Age: 44
End: 2024-02-20

## 2024-02-20 NOTE — TELEPHONE ENCOUNTER
Patient  reports he is experiencing abdominal pain (steady)-----seems to move around from middle of abdomen to right side.    States his stomach feels uneasy, like a pulled muscle. Always there X 2 weeks. Moves around stomach. Constant ache. Please advise.      Spoke with patient per result note: Please inform patient that CT abdomen did not show any suspicious lesions in the right kidney.  Evidence of slight anatomic change of right kidney but nothing worrisome.  CT abdomen did show fatty deposition of the liver-this is probably contributing to his liver enzyme elevation.

## 2024-02-20 NOTE — TELEPHONE ENCOUNTER
To Dr. Coffman-    Patient would like the results of his CT Scan from 2/16/24------also, reports he is experiencing abdominal pain (steady)-----seems to move around from middle of abdomen to right side.

## 2024-02-21 NOTE — TELEPHONE ENCOUNTER
Let patient know that there is nothing obvious on recent CT of abdomen that would explain his symptoms. As it has been there for couple weeks and persisting he probably ought to be seen by someone in this office to follow-up on that.

## 2024-02-21 NOTE — TELEPHONE ENCOUNTER
Spoke to patient per Dr. Coffman's note. He will call back to schedule an appointment when he is near his schedule.

## 2024-06-14 ENCOUNTER — OFFICE VISIT (OUTPATIENT)
Dept: FAMILY MEDICINE CLINIC | Age: 44
End: 2024-06-14
Payer: COMMERCIAL

## 2024-06-14 VITALS
WEIGHT: 262.1 LBS | SYSTOLIC BLOOD PRESSURE: 148 MMHG | HEART RATE: 60 BPM | RESPIRATION RATE: 16 BRPM | DIASTOLIC BLOOD PRESSURE: 98 MMHG | BODY MASS INDEX: 35.5 KG/M2 | OXYGEN SATURATION: 95 % | HEIGHT: 72 IN

## 2024-06-14 DIAGNOSIS — R10.31 RIGHT LOWER QUADRANT ABDOMINAL PAIN: ICD-10-CM

## 2024-06-14 DIAGNOSIS — R79.89 LFT ELEVATION: ICD-10-CM

## 2024-06-14 DIAGNOSIS — R10.31 RIGHT LOWER QUADRANT ABDOMINAL PAIN: Primary | ICD-10-CM

## 2024-06-14 DIAGNOSIS — R03.0 BORDERLINE HYPERTENSION: ICD-10-CM

## 2024-06-14 DIAGNOSIS — E66.09 CLASS 1 OBESITY DUE TO EXCESS CALORIES WITH SERIOUS COMORBIDITY AND BODY MASS INDEX (BMI) OF 34.0 TO 34.9 IN ADULT: ICD-10-CM

## 2024-06-14 DIAGNOSIS — K21.9 GASTROESOPHAGEAL REFLUX DISEASE WITHOUT ESOPHAGITIS: ICD-10-CM

## 2024-06-14 PROCEDURE — 99213 OFFICE O/P EST LOW 20 MIN: CPT | Performed by: FAMILY MEDICINE

## 2024-06-14 RX ORDER — OMEPRAZOLE 40 MG/1
CAPSULE, DELAYED RELEASE ORAL
Qty: 90 CAPSULE | Refills: 1 | Status: SHIPPED | OUTPATIENT
Start: 2024-06-14

## 2024-06-14 ASSESSMENT — ENCOUNTER SYMPTOMS
CHEST TIGHTNESS: 0
WHEEZING: 0
ABDOMINAL PAIN: 0
EYE PAIN: 0
NAUSEA: 0
VOMITING: 0
BLOOD IN STOOL: 0
SHORTNESS OF BREATH: 0
TROUBLE SWALLOWING: 0
DIARRHEA: 0

## 2024-06-14 NOTE — PROGRESS NOTES
6/14/2024    Ronn Coronel    Chief Complaint   Patient presents with    Abdominal Pain     Abdominal pain right lower quadrant consistent for six months.     Urinary Pain     At times will have pain up in right lower abdomen when he urinates.     Stool Color Change     Pale tan stool at times.        HPI  History was obtained from the patient.  Ronn is a 44 y.o. male who presents today with with persistent right lower quadrant pains low-level present much of the time occasionally gets sharp pain that occurs when he urinating.  Patient's last labs and CT of the abdomen was in January 2024.  No associated fever or nausea.  He has a past history of borderline hypertension, BMI of 36.0, history of elevated LFTs, and history of fatty liver, and history of anxiety depression currently improved.  No real rebound tenderness to exam does point to right lower quadrant area of pain.  Pressure remains borderline elevated today.  No true dysuria at this point.  CT scan of abdomen showed changes of fatty liver, nonobstructive nephrolith right lower kidney, atrophic right kidney (lower pole).    REVIEW OF SYMPTOMS    Review of Systems   Constitutional:  Negative for activity change and fatigue.        History of mild obesity   HENT:  Negative for congestion, hearing loss, mouth sores and trouble swallowing.    Eyes:  Negative for pain and visual disturbance.   Respiratory:  Negative for chest tightness, shortness of breath and wheezing.    Cardiovascular:  Negative for chest pain and palpitations.   Gastrointestinal:  Negative for abdominal pain, blood in stool, diarrhea, nausea and vomiting.        Past history of fatty liver and GERD.  Now with persistent but occasionally worse in the right lower quadrant pain BMs are fairly normal although does occasionally have light stool may be related to either diet changes or change in transit time.  Please see HPI   Genitourinary:  Negative for dysuria, frequency and urgency.

## 2024-06-15 LAB
ALBUMIN SERPL-MCNC: 4.4 G/DL (ref 3.4–5)
ALBUMIN/GLOB SERPL: 1.6 {RATIO} (ref 1.1–2.2)
ALP SERPL-CCNC: 83 U/L (ref 40–129)
ALT SERPL-CCNC: 121 U/L (ref 10–40)
ANION GAP SERPL CALCULATED.3IONS-SCNC: 10 MMOL/L (ref 3–16)
AST SERPL-CCNC: 48 U/L (ref 15–37)
BACTERIA URNS QL MICRO: NORMAL /HPF
BILIRUB SERPL-MCNC: 0.4 MG/DL (ref 0–1)
BUN SERPL-MCNC: 13 MG/DL (ref 7–20)
CALCIUM SERPL-MCNC: 9.4 MG/DL (ref 8.3–10.6)
CHLORIDE SERPL-SCNC: 105 MMOL/L (ref 99–110)
CO2 SERPL-SCNC: 26 MMOL/L (ref 21–32)
CREAT SERPL-MCNC: 0.9 MG/DL (ref 0.9–1.3)
EPI CELLS #/AREA URNS AUTO: 0 /HPF (ref 0–5)
GFR SERPLBLD CREATININE-BSD FMLA CKD-EPI: >90 ML/MIN/{1.73_M2}
GLUCOSE SERPL-MCNC: 97 MG/DL (ref 70–99)
HAV IGM SERPL QL IA: NORMAL
HBV CORE IGM SERPL QL IA: NORMAL
HBV SURFACE AG SERPL QL IA: NORMAL
HCV AB SERPL QL IA: NORMAL
HYALINE CASTS #/AREA URNS AUTO: 0 /LPF (ref 0–8)
POTASSIUM SERPL-SCNC: 5 MMOL/L (ref 3.5–5.1)
PROT SERPL-MCNC: 7.2 G/DL (ref 6.4–8.2)
RBC CLUMPS #/AREA URNS AUTO: 0 /HPF (ref 0–4)
SODIUM SERPL-SCNC: 141 MMOL/L (ref 136–145)
URN SPEC COLLECT METH UR: NORMAL
WBC #/AREA URNS AUTO: 0 /HPF (ref 0–5)

## 2024-06-18 ENCOUNTER — TELEPHONE (OUTPATIENT)
Dept: FAMILY MEDICINE CLINIC | Age: 44
End: 2024-06-18

## 2024-06-18 DIAGNOSIS — R10.31 RIGHT LOWER QUADRANT ABDOMINAL PAIN: Primary | ICD-10-CM

## 2024-06-18 DIAGNOSIS — R79.89 LFT ELEVATION: ICD-10-CM

## 2024-06-18 NOTE — TELEPHONE ENCOUNTER
Yes, that would be fine- okay to place referral to [facility] GI medicine.  Diagnosis: chronic right lower quadrant abdominal pain /history of fatty liver and LFT elevation.

## 2024-06-19 ENCOUNTER — TELEPHONE (OUTPATIENT)
Dept: GASTROENTEROLOGY | Age: 44
End: 2024-06-19

## 2024-06-19 NOTE — TELEPHONE ENCOUNTER
Called pt. In regards to a referral for RLQ pain and elevated LFTs. Lm for pt to call back to make an appt

## 2024-06-26 ENCOUNTER — TELEPHONE (OUTPATIENT)
Dept: GASTROENTEROLOGY | Age: 44
End: 2024-06-26

## 2024-07-03 ENCOUNTER — TELEPHONE (OUTPATIENT)
Dept: GASTROENTEROLOGY | Age: 44
End: 2024-07-03

## 2024-09-11 ENCOUNTER — TELEPHONE (OUTPATIENT)
Dept: GASTROENTEROLOGY | Age: 44
End: 2024-09-11

## 2024-09-11 ENCOUNTER — OFFICE VISIT (OUTPATIENT)
Dept: FAMILY MEDICINE CLINIC | Age: 44
End: 2024-09-11
Payer: COMMERCIAL

## 2024-09-11 VITALS
SYSTOLIC BLOOD PRESSURE: 136 MMHG | OXYGEN SATURATION: 96 % | HEIGHT: 72 IN | HEART RATE: 66 BPM | WEIGHT: 257.7 LBS | RESPIRATION RATE: 16 BRPM | DIASTOLIC BLOOD PRESSURE: 86 MMHG | BODY MASS INDEX: 34.9 KG/M2

## 2024-09-11 DIAGNOSIS — K21.9 GASTROESOPHAGEAL REFLUX DISEASE WITHOUT ESOPHAGITIS: ICD-10-CM

## 2024-09-11 DIAGNOSIS — G43.809 OTHER MIGRAINE WITHOUT STATUS MIGRAINOSUS, NOT INTRACTABLE: ICD-10-CM

## 2024-09-11 DIAGNOSIS — F41.9 ANXIETY: Primary | ICD-10-CM

## 2024-09-11 DIAGNOSIS — R03.0 BORDERLINE HYPERTENSION: ICD-10-CM

## 2024-09-11 DIAGNOSIS — R10.30 LOWER ABDOMINAL PAIN: ICD-10-CM

## 2024-09-11 PROCEDURE — 99214 OFFICE O/P EST MOD 30 MIN: CPT | Performed by: FAMILY MEDICINE

## 2024-09-11 RX ORDER — PANTOPRAZOLE SODIUM 40 MG/1
40 TABLET, DELAYED RELEASE ORAL
Qty: 90 TABLET | Refills: 1 | Status: SHIPPED | OUTPATIENT
Start: 2024-09-11

## 2024-09-11 RX ORDER — PANTOPRAZOLE SODIUM 40 MG/1
40 TABLET, DELAYED RELEASE ORAL
Qty: 30 TABLET | Refills: 5 | Status: SHIPPED | OUTPATIENT
Start: 2024-09-11 | End: 2024-09-11

## 2024-09-11 ASSESSMENT — ENCOUNTER SYMPTOMS
CHEST TIGHTNESS: 0
ABDOMINAL PAIN: 0
BLOOD IN STOOL: 0
WHEEZING: 0
NAUSEA: 0
DIARRHEA: 0
EYE PAIN: 0
VOMITING: 0
TROUBLE SWALLOWING: 0
SHORTNESS OF BREATH: 0

## 2024-10-03 ENCOUNTER — OFFICE VISIT (OUTPATIENT)
Dept: GASTROENTEROLOGY | Age: 44
End: 2024-10-03
Payer: COMMERCIAL

## 2024-10-03 ENCOUNTER — PREP FOR PROCEDURE (OUTPATIENT)
Dept: GASTROENTEROLOGY | Age: 44
End: 2024-10-03

## 2024-10-03 VITALS
WEIGHT: 256 LBS | BODY MASS INDEX: 35.84 KG/M2 | DIASTOLIC BLOOD PRESSURE: 80 MMHG | SYSTOLIC BLOOD PRESSURE: 130 MMHG | HEIGHT: 71 IN

## 2024-10-03 DIAGNOSIS — R14.0 BLOATING: ICD-10-CM

## 2024-10-03 DIAGNOSIS — Z12.11 ENCOUNTER FOR SCREENING COLONOSCOPY: ICD-10-CM

## 2024-10-03 DIAGNOSIS — K21.9 GASTROESOPHAGEAL REFLUX DISEASE, UNSPECIFIED WHETHER ESOPHAGITIS PRESENT: ICD-10-CM

## 2024-10-03 DIAGNOSIS — R74.8 ELEVATED LIVER ENZYMES: Primary | ICD-10-CM

## 2024-10-03 DIAGNOSIS — K76.0 FATTY LIVER: ICD-10-CM

## 2024-10-03 PROCEDURE — 99204 OFFICE O/P NEW MOD 45 MIN: CPT | Performed by: NURSE PRACTITIONER

## 2024-10-03 RX ORDER — DICYCLOMINE HYDROCHLORIDE 10 MG/1
10 CAPSULE ORAL 4 TIMES DAILY PRN
Qty: 120 CAPSULE | Refills: 0 | Status: SHIPPED | OUTPATIENT
Start: 2024-10-03

## 2024-10-03 RX ORDER — POLYETHYLENE GLYCOL 3350, SODIUM SULFATE, POTASSIUM CHLORIDE, MAGNESIUM SULFATE, AND SODIUM CHLORIDE FOR ORAL SOLUTION 178.7-7.3G
1 KIT ORAL ONCE
Qty: 1 EACH | Refills: 0 | Status: SHIPPED | OUTPATIENT
Start: 2024-10-03 | End: 2024-10-03

## 2024-10-03 ASSESSMENT — ENCOUNTER SYMPTOMS
EYE DISCHARGE: 0
SHORTNESS OF BREATH: 0
BACK PAIN: 0
EYE PAIN: 0
VOMITING: 0
COLOR CHANGE: 0
BLOOD IN STOOL: 0
DIARRHEA: 0
WHEEZING: 0
NAUSEA: 0
CONSTIPATION: 0
ABDOMINAL PAIN: 1
COUGH: 1

## 2024-10-03 NOTE — PROGRESS NOTES
Ronn Coronel 44 y.o. male was seen by LEANN Caal on 10/3/2024     Wt Readings from Last 3 Encounters:   10/03/24 116.1 kg (256 lb)   09/11/24 116.9 kg (257 lb 11.2 oz)   06/14/24 118.9 kg (262 lb 1.6 oz)       HPI  Ronn Coronel is a pleasant 44 y.o.  male who presents today for acid reflux, bloating, elevated liver enzymes, fatty liver and lower abdominal pain.  He has a past medical history of anxiety, borderline hypertension, GERD (gastroesophageal reflux disease), migraine, and obesity.  He has never had a EGD or colonoscopy.  He denies NSAID use.  Alcohol use every third day with mixed drinks of Rum or [place] in coke.  He has no known history or family history of liver disease.  His CT of abdomen/pelvis done on 2-9-2024 showed no suspicious renal lesion identified, atrophic lower pole of right kidney, likely remote vascular versus infectious insult, punctate nonobstructing nephrolith of lower right kidney, diffuse hepatic steatosis, and small hiatal hernia.  His lab work done on 6- showed Total Bilirubin 0.4, Alkaline Phosphatase 83, , and AST 48. Acute hepatitis panel negative for Hepatitis A, B and C.  His appetite is good without early satiety. His weight is stable.  No nausea or vomiting.  His abdominal pain has been ongoing for eight months.  His current pain is described as a 2/10 constant right lower abdominal pain described as dull cramping pain like he did too many sit ups.  Nothing makes it better.  Worse if he drinks coca cola.  Intermittent bloating.  His GERD has been ongoing for ten years.  His heartburn and acid reflux is fairly controlled with Prilosec.  Nocturnal awakenings with acid reflux two nights a week.  No dysphagia or pain with swallowing.  No excess belching or flatulence.  He denies changes in his bowel pattern.  His typical bowel pattern is daily with soft brown formed stools.  No diarrhea or constipation.  No blood in his stools or melena.

## 2024-10-07 RX ORDER — SODIUM CHLORIDE 0.9 % (FLUSH) 0.9 %
5-40 SYRINGE (ML) INJECTION EVERY 12 HOURS SCHEDULED
Status: CANCELLED | OUTPATIENT
Start: 2024-10-07

## 2024-10-07 RX ORDER — SODIUM CHLORIDE 0.9 % (FLUSH) 0.9 %
5-40 SYRINGE (ML) INJECTION PRN
Status: CANCELLED | OUTPATIENT
Start: 2024-10-07

## 2024-10-07 RX ORDER — SODIUM CHLORIDE, SODIUM LACTATE, POTASSIUM CHLORIDE, CALCIUM CHLORIDE 600; 310; 30; 20 MG/100ML; MG/100ML; MG/100ML; MG/100ML
INJECTION, SOLUTION INTRAVENOUS CONTINUOUS
Status: CANCELLED | OUTPATIENT
Start: 2024-10-07

## 2024-10-07 RX ORDER — SODIUM CHLORIDE 9 MG/ML
INJECTION, SOLUTION INTRAVENOUS PRN
Status: CANCELLED | OUTPATIENT
Start: 2024-10-07

## 2024-11-02 PROBLEM — Z12.11 ENCOUNTER FOR SCREENING COLONOSCOPY: Status: RESOLVED | Noted: 2024-10-03 | Resolved: 2024-11-02

## 2024-11-05 PROBLEM — Z12.11 ENCOUNTER FOR SCREENING COLONOSCOPY: Status: ACTIVE | Noted: 2024-10-03

## 2024-11-06 ENCOUNTER — HOSPITAL ENCOUNTER (OUTPATIENT)
Age: 44
Discharge: HOME | End: 2024-11-06
Payer: COMMERCIAL

## 2024-11-06 DIAGNOSIS — K76.0 FATTY LIVER: ICD-10-CM

## 2024-11-06 DIAGNOSIS — R74.8 ELEVATED LIVER ENZYMES: ICD-10-CM

## 2024-11-06 LAB
ALBUMIN SERPL-MCNC: 4.1 G/DL (ref 3.4–5)
ALBUMIN/GLOB SERPL: 1.5 {RATIO} (ref 1.1–2.2)
ALP SERPL-CCNC: 81 U/L (ref 40–129)
ALT SERPL-CCNC: 81 U/L (ref 10–40)
AST SERPL-CCNC: 43 U/L (ref 15–37)
BASOPHILS # BLD: 0.03 K/UL
BASOPHILS NFR BLD: 1 % (ref 0–1)
BILIRUB DIRECT SERPL-MCNC: 0.2 MG/DL (ref 0–0.3)
BILIRUB INDIRECT SERPL-MCNC: 0.4 MG/DL (ref 0–0.7)
BILIRUB SERPL-MCNC: 0.7 MG/DL (ref 0–1)
EOSINOPHIL # BLD: 0.1 K/UL
EOSINOPHILS RELATIVE PERCENT: 2 % (ref 0–3)
ERYTHROCYTE [DISTWIDTH] IN BLOOD BY AUTOMATED COUNT: 12.6 % (ref 11.7–14.9)
FERRITIN SERPL-MCNC: 363 NG/ML (ref 30–400)
GGT SERPL-CCNC: 23 U/L (ref 8–61)
HCT VFR BLD AUTO: 46.5 % (ref 42–52)
HGB BLD-MCNC: 15.5 G/DL (ref 13.5–18)
IMM GRANULOCYTES # BLD AUTO: 0.01 K/UL
IMM GRANULOCYTES NFR BLD: 0 %
INR PPP: 1
IRON SATN MFR SERPL: 40 % (ref 15–50)
IRON SERPL-MCNC: 110 UG/DL (ref 59–158)
LYMPHOCYTES NFR BLD: 2.03 K/UL
LYMPHOCYTES RELATIVE PERCENT: 38 % (ref 24–44)
MCH RBC QN AUTO: 30.2 PG (ref 27–31)
MCHC RBC AUTO-ENTMCNC: 33.3 G/DL (ref 32–36)
MCV RBC AUTO: 90.5 FL (ref 78–100)
MONOCYTES NFR BLD: 0.5 K/UL
MONOCYTES NFR BLD: 9 % (ref 0–4)
NEUTROPHILS NFR BLD: 50 % (ref 36–66)
NEUTS SEG NFR BLD: 2.68 K/UL
PLATELET # BLD AUTO: 270 K/UL (ref 140–440)
PMV BLD AUTO: 10.2 FL (ref 7.5–11.1)
PROT SERPL-MCNC: 6.8 G/DL (ref 6.4–8.2)
PROTHROMBIN TIME: 13.2 SEC (ref 11.7–14.5)
RBC # BLD AUTO: 5.14 M/UL (ref 4.6–6.2)
TIBC SERPL-MCNC: 272 UG/DL (ref 260–445)
UNSATURATED IRON BINDING CAPACITY: 162 UG/DL (ref 110–370)
WBC OTHER # BLD: 5.4 K/UL (ref 4–10.5)

## 2024-11-06 PROCEDURE — 86039 ANTINUCLEAR ANTIBODIES (ANA): CPT

## 2024-11-06 PROCEDURE — 82977 ASSAY OF GGT: CPT

## 2024-11-06 PROCEDURE — 36415 COLL VENOUS BLD VENIPUNCTURE: CPT

## 2024-11-06 PROCEDURE — 85610 PROTHROMBIN TIME: CPT

## 2024-11-06 PROCEDURE — 82103 ALPHA-1-ANTITRYPSIN TOTAL: CPT

## 2024-11-06 PROCEDURE — 86038 ANTINUCLEAR ANTIBODIES: CPT

## 2024-11-06 PROCEDURE — 83550 IRON BINDING TEST: CPT

## 2024-11-06 PROCEDURE — 85025 COMPLETE CBC W/AUTO DIFF WBC: CPT

## 2024-11-06 PROCEDURE — 86376 MICROSOMAL ANTIBODY EACH: CPT

## 2024-11-06 PROCEDURE — 82728 ASSAY OF FERRITIN: CPT

## 2024-11-06 PROCEDURE — 82390 ASSAY OF CERULOPLASMIN: CPT

## 2024-11-06 PROCEDURE — 82104 ALPHA-1-ANTITRYPSIN PHENO: CPT

## 2024-11-06 PROCEDURE — 83516 IMMUNOASSAY NONANTIBODY: CPT

## 2024-11-06 PROCEDURE — 80076 HEPATIC FUNCTION PANEL: CPT

## 2024-11-06 PROCEDURE — 83540 ASSAY OF IRON: CPT

## 2024-11-07 LAB — ANTI-NUCLEAR ANTIBODY (ANA): NEGATIVE

## 2024-11-07 NOTE — PROGRESS NOTES
Spoke with patient and he will be called with an arrival time on 11/11/2024 for his procedure at [facility] on 11/12/2024. Patient will go to the office and get his instructions.    NOTHING TO EAT OR DRINK AFTER MIDNIGHT DAY OF SURGERY    1. Enter thru the hospital main entrance on day of surgery, check in at the Information Desk. If you arrive prior to 6:00am, enter thru the ER entrance.    2. Follow the directions as prescribed by the doctor for your procedure and medications.         Morning of surgery take: protonix.         Stop vitamins, supplements and NSAIDS:      3. Check with your Doctor regarding stopping blood thinners and follow their instructions.    4. Do not smoke, vape or use chewing tobacco morning of surgery. Do not drink any alcoholic beverages 24 hours prior to surgery.       This includes NA Beer. No street drugs 7 days prior to surgery.    5. If you have dentures, contacts of glasses they will be removed before going to the OR; please bring a case.    6. Please bring picture ID, insurance card, paperwork from the doctor’s office (H & P, Consent, & card for implantable devices).    7. Take a shower with an antibacterial soap the night before surgery and the morning of surgery. Do not put anything on your skin      After your morning shower.    8. You will need a responsible adult to drive you home and check on you after surgery.

## 2024-11-08 LAB
CERULOPLASMIN SERPL-MCNC: 24 MG/DL (ref 15–30)
LKM-1 IGG SER IA-ACNC: 1 U (ref 0–24.9)
MITOCHONDRIA M2 IGG SER-ACNC: 4.9 UNITS (ref 0–24.9)
SMOOTH MUSCLE ANTIBODY: 10 UNITS (ref 0–19)

## 2024-11-09 LAB
ALPHA-1 ANTITRYPSIN PHENOTYPE: NORMAL
ALPHA-1 ANTITRYPSIN: 139 MG/DL (ref 90–200)

## 2024-11-11 ENCOUNTER — ANESTHESIA EVENT (OUTPATIENT)
Dept: ENDOSCOPY | Age: 44
End: 2024-11-11
Payer: COMMERCIAL

## 2024-11-11 NOTE — PROGRESS NOTES
Spoke with patient and he will arrive at 1030 at [facility] on 11/12/2024 for his procedure at 1200.

## 2024-11-11 NOTE — ANESTHESIA PRE PROCEDURE
Department of Anesthesiology  Preprocedure Note       Name:  Ronn Coronel   Age:  44 y.o.  :  1980                                          MRN:  [id number]         Date:  2024      Surgeon: Surgeon(s):  Jac Chi MD    Procedure: Procedure(s):  COLONOSCOPY DIAGNOSTIC  ESOPHAGOGASTRODUODENOSCOPY BIOPSY    Medications prior to admission:   Prior to Admission medications    Medication Sig Start Date End Date Taking? Authorizing Provider   dicyclomine (BENTYL) 10 MG capsule Take 1 capsule by mouth 4 times daily as needed (for abdominal cramping pain) 10/3/24   Mariella Sorenson, APRN - CNP   pantoprazole (PROTONIX) 40 MG tablet Take 1 tablet by mouth every morning (before breakfast) 24   Eran Schultz MD   Multiple Vitamins-Minerals (MULTIVITAMIN ADULT PO) Take by mouth    Provider, MD Andrade       Current medications:    No current facility-administered medications for this encounter.     Current Outpatient Medications   Medication Sig Dispense Refill   • dicyclomine (BENTYL) 10 MG capsule Take 1 capsule by mouth 4 times daily as needed (for abdominal cramping pain) 120 capsule 0   • pantoprazole (PROTONIX) 40 MG tablet Take 1 tablet by mouth every morning (before breakfast) 90 tablet 1   • Multiple Vitamins-Minerals (MULTIVITAMIN ADULT PO) Take by mouth         Allergies:    Allergies   Allergen Reactions   • Bactrim [Sulfamethoxazole-Trimethoprim]    • Sulfa Antibiotics Other (See Comments)     Doesn't recall reaction but had to be seen in ED       Problem List:    Patient Active Problem List   Diagnosis Code   • GERD (gastroesophageal reflux disease) K21.9   • Anxiety/depression F41.9   • Migraine without status migrainosus, not intractable G43.909   • Class 1 obesity due to excess calories with serious comorbidity and body mass index (BMI) of 34.0 to 34.9 in adult E66.811, E66.09, Z68.34   • LFT elevation-fatty liver R79.89   • Borderline hypertension R03.0   •

## 2024-11-12 ENCOUNTER — HOSPITAL ENCOUNTER (OUTPATIENT)
Age: 44
Setting detail: OUTPATIENT SURGERY
Discharge: HOME | End: 2024-11-12
Attending: INTERNAL MEDICINE | Admitting: INTERNAL MEDICINE
Payer: COMMERCIAL

## 2024-11-12 ENCOUNTER — ANESTHESIA (OUTPATIENT)
Dept: ENDOSCOPY | Age: 44
End: 2024-11-12
Payer: COMMERCIAL

## 2024-11-12 VITALS
WEIGHT: 256 LBS | HEART RATE: 68 BPM | RESPIRATION RATE: 16 BRPM | HEIGHT: 72 IN | DIASTOLIC BLOOD PRESSURE: 86 MMHG | SYSTOLIC BLOOD PRESSURE: 131 MMHG | BODY MASS INDEX: 34.67 KG/M2 | OXYGEN SATURATION: 97 % | TEMPERATURE: 97.4 F

## 2024-11-12 DIAGNOSIS — K21.9 GERD (GASTROESOPHAGEAL REFLUX DISEASE): ICD-10-CM

## 2024-11-12 DIAGNOSIS — R14.0 BLOATING: ICD-10-CM

## 2024-11-12 DIAGNOSIS — Z12.11 ENCOUNTER FOR SCREENING COLONOSCOPY: ICD-10-CM

## 2024-11-12 PROCEDURE — 2500000003 HC RX 250 WO HCPCS

## 2024-11-12 PROCEDURE — 6360000002 HC RX W HCPCS

## 2024-11-12 PROCEDURE — 3609010600 HC COLONOSCOPY POLYPECTOMY SNARE/COLD BIOPSY: Performed by: INTERNAL MEDICINE

## 2024-11-12 PROCEDURE — 2709999900 HC NON-CHARGEABLE SUPPLY: Performed by: INTERNAL MEDICINE

## 2024-11-12 PROCEDURE — 3700000001 HC ADD 15 MINUTES (ANESTHESIA): Performed by: INTERNAL MEDICINE

## 2024-11-12 PROCEDURE — 3700000000 HC ANESTHESIA ATTENDED CARE: Performed by: INTERNAL MEDICINE

## 2024-11-12 PROCEDURE — 88342 IMHCHEM/IMCYTCHM 1ST ANTB: CPT | Performed by: PATHOLOGY

## 2024-11-12 PROCEDURE — 3609012400 HC EGD TRANSORAL BIOPSY SINGLE/MULTIPLE: Performed by: INTERNAL MEDICINE

## 2024-11-12 PROCEDURE — 88305 TISSUE EXAM BY PATHOLOGIST: CPT | Performed by: PATHOLOGY

## 2024-11-12 PROCEDURE — 7100000011 HC PHASE II RECOVERY - ADDTL 15 MIN: Performed by: INTERNAL MEDICINE

## 2024-11-12 PROCEDURE — 7100000010 HC PHASE II RECOVERY - FIRST 15 MIN: Performed by: INTERNAL MEDICINE

## 2024-11-12 RX ORDER — LIDOCAINE HYDROCHLORIDE 20 MG/ML
INJECTION, SOLUTION EPIDURAL; INFILTRATION; INTRACAUDAL; PERINEURAL
Status: DISCONTINUED | OUTPATIENT
Start: 2024-11-12 | End: 2024-11-12 | Stop reason: SDUPTHER

## 2024-11-12 RX ORDER — SODIUM CHLORIDE 0.9 % (FLUSH) 0.9 %
5-40 SYRINGE (ML) INJECTION EVERY 12 HOURS SCHEDULED
Status: DISCONTINUED | OUTPATIENT
Start: 2024-11-12 | End: 2024-11-12 | Stop reason: HOSPADM

## 2024-11-12 RX ORDER — SODIUM CHLORIDE, SODIUM LACTATE, POTASSIUM CHLORIDE, CALCIUM CHLORIDE 600; 310; 30; 20 MG/100ML; MG/100ML; MG/100ML; MG/100ML
INJECTION, SOLUTION INTRAVENOUS CONTINUOUS
Status: DISCONTINUED | OUTPATIENT
Start: 2024-11-12 | End: 2024-11-12 | Stop reason: HOSPADM

## 2024-11-12 RX ORDER — SODIUM CHLORIDE 0.9 % (FLUSH) 0.9 %
5-40 SYRINGE (ML) INJECTION PRN
Status: DISCONTINUED | OUTPATIENT
Start: 2024-11-12 | End: 2024-11-12 | Stop reason: HOSPADM

## 2024-11-12 RX ORDER — PROPOFOL 10 MG/ML
INJECTION, EMULSION INTRAVENOUS
Status: DISCONTINUED | OUTPATIENT
Start: 2024-11-12 | End: 2024-11-12 | Stop reason: SDUPTHER

## 2024-11-12 RX ORDER — SODIUM CHLORIDE 9 MG/ML
INJECTION, SOLUTION INTRAVENOUS PRN
Status: DISCONTINUED | OUTPATIENT
Start: 2024-11-12 | End: 2024-11-12 | Stop reason: HOSPADM

## 2024-11-12 RX ADMIN — PROPOFOL 600 MG: 10 INJECTION, EMULSION INTRAVENOUS at 11:06

## 2024-11-12 RX ADMIN — LIDOCAINE HYDROCHLORIDE 100 MG: 20 INJECTION, SOLUTION EPIDURAL; INFILTRATION; INTRACAUDAL; PERINEURAL at 11:06

## 2024-11-12 ASSESSMENT — PAIN - FUNCTIONAL ASSESSMENT
PAIN_FUNCTIONAL_ASSESSMENT: 0-10
PAIN_FUNCTIONAL_ASSESSMENT: 0-10

## 2024-11-12 ASSESSMENT — PAIN SCALES - GENERAL: PAINLEVEL_OUTOF10: 0

## 2024-11-12 NOTE — ANESTHESIA POSTPROCEDURE EVALUATION
Department of Anesthesiology  Postprocedure Note    Patient: Ronn Coronel  MRN: [id number]  YOB: 1980  Date of evaluation: 11/12/2024    Procedure Summary       Date: 11/12/24 Room / Location: [place] [address] / [facility]    Anesthesia Start: 1102 Anesthesia Stop: 1150    Procedures:       COLONOSCOPY POLYPECTOMY SNARE/BIOPSY      ESOPHAGOGASTRODUODENOSCOPY BIOPSY Diagnosis:       Encounter for screening colonoscopy      GERD (gastroesophageal reflux disease)      Bloating      (Encounter for screening colonoscopy [Z12.11])      (GERD (gastroesophageal reflux disease) [K21.9])      (Bloating [R14.0])    Surgeons: Jac Chi MD Responsible Provider: Ramon Reyes MD    Anesthesia Type: MAC ASA Status: 2            Anesthesia Type: MAC    Orly Phase I: Orly Score: 10    Orly Phase II:      Anesthesia Post Evaluation    Patient location during evaluation: bedside  Patient participation: complete - patient participated  Level of consciousness: sleepy but conscious  Airway patency: patent  Nausea & Vomiting: no nausea and no vomiting  Cardiovascular status: hemodynamically stable  Respiratory status: acceptable, spontaneous ventilation and room air  Hydration status: stable  Pain management: adequate    No notable events documented.

## 2024-11-12 NOTE — PROGRESS NOTES
6203 patient to room from endo a/o vss assessment wnl,family at bedside,call light in reach,beverage of choice given

## 2024-11-12 NOTE — ADDENDUM NOTE
Addendum  created 11/12/24 1152 by Jennifer Duke APRN - CRNA    Attestation recorded in Intraprocedure, Flowsheet accepted, Intraprocedure Attestations filed

## 2024-11-12 NOTE — H&P
GASTROENTEROLOGY H&P NOTE      11/12/2024      ATTENDING PHYSICIAN:  Jac Chi MD      HISTORY OF PRESENT ILLNESS:    44-year-old male with history of chronic GERD and abdominal pain and cramps with bloating.  Patient is here for EGD to follow-up on GERD and and colonoscopy for screening for colon cancer.    Medical History:   Past Medical History:   Diagnosis Date    GERD (gastroesophageal reflux disease)        SURGICAL HISTORY:  Past Surgical History:   Procedure Laterality Date    VASECTOMY  2017       MEDS:  Prior to Admission medications    Medication Sig Start Date End Date Taking? Authorizing Provider   dicyclomine (BENTYL) 10 MG capsule Take 1 capsule by mouth 4 times daily as needed (for abdominal cramping pain)  Patient not taking: Reported on 11/12/2024 10/3/24   Mariella Sorenson APRN - CNP   pantoprazole (PROTONIX) 40 MG tablet Take 1 tablet by mouth every morning (before breakfast)  Patient not taking: Reported on 11/12/2024 9/11/24   Eran Schultz MD   Multiple Vitamins-Minerals (MULTIVITAMIN ADULT PO) Take by mouth  Patient not taking: Reported on 11/12/2024    ProviderAndrade MD     Scheduled Meds:   sodium chloride flush  5-40 mL IntraVENous 2 times per day     Continuous Infusions:   sodium chloride      lactated ringers       PRN Meds:sodium chloride flush, sodium chloride    Allergies   Allergen Reactions    Bactrim [Sulfamethoxazole-Trimethoprim]     Sulfa Antibiotics Other (See Comments)     Doesn't recall reaction but had to be seen in ED       SOCIAL HISTORY:   Social History     Socioeconomic History    Marital status:      Spouse name: Not on file    Number of children: Not on file    Years of education: Not on file    Highest education level: Not on file   Occupational History    Not on file   Tobacco Use    Smoking status: Never    Smokeless tobacco: Never   Vaping Use    Vaping status: Never Used   Substance

## 2024-11-12 NOTE — PROGRESS NOTES
SUBJECTIVE:   CC: Kassie Ramirez is an 46 year old woman who presents for preventive health visit.     Healthy Habits:    Do you get at least three servings of calcium containing foods daily (dairy, green leafy vegetables, etc.)? yes    Amount of exercise or daily activities, outside of work: 3 day(s) per week    Problems taking medications regularly No    Medication side effects: No    Have you had an eye exam in the past two years? no    Do you see a dentist twice per year? yes    Do you have sleep apnea, excessive snoring or daytime drowsiness?no    Stress incontinence  Sling procedure 2016.  Markedly improved since that time.    Depression/anxiety Followup  Taking citalopram 20 mg daily    Status since last visit: Stable     See PHQ-9 for current symptoms.  Other associated symptoms: None    Complicating factors:   Significant life event:  No   Current substance abuse:  None  Anxiety or Panic symptoms:  No    PHQ-9 9/1/2017 3/8/2018 7/25/2018   Total Score 2 2 6   Q9: Suicide Ideation Not at all Not at all Not at all       PHQ-9  English  PHQ-9   Any Language  Suicide Assessment Five-step Evaluation and Treatment (SAFE-T)    Migraine Follow-Up  Historically she had menstrual migraines approximately once monthly.  She currently takes oral contraception with the active pills only and reports rare issues with migraine headaches.  She also takes an over-the-counter magnesium supplement.  She reports that over the last 2-3 months she has only had one headache which responded well to Imitrex.   She has had a brain MRI from 04/2017 which was normal. She was seen for migraines last year and was prescribed oral contraceptives and magnesium which helped initially but her current migraines have been unchanged since when she was seen on 04/07/17. She has been treated by chiropractic medicine, massage therapy, and acupuncture without relief.  She does not get aura with her migraines but is sensitive to light and  1230 In to check on pt. Pt given more water. Pt states that he is ready to get dressed to go home.  Pt up to side of bed. Pt tolerated well and ready to get dressed to go home. Call light in reach.[phone] Wife to get car. 1242 Pt discharged to home per wheelchair via nurse.    sound.    Fatigue  She reports that she feels tired all of the time.  She reports a good diet, adequate sleep, regular exercise.  When asked about any menopausal symptoms such as hot flashes, she denies these symptoms.    Abuse: Current or Past(Physical, Sexual or Emotional)- No  Do you feel safe in your environment - Yes    Social History   Substance Use Topics     Smoking status: Never Smoker     Smokeless tobacco: Never Used     Alcohol use No      Comment: 1 time per month     If you drink alcohol do you typically have >3 drinks per day or >7 drinks per week? No                     Reviewed orders with patient.  Reviewed health maintenance and updated orders accordingly - Yes  BP Readings from Last 3 Encounters:   07/25/18 112/76   05/16/18 106/70   06/26/17 104/68    Wt Readings from Last 3 Encounters:   07/25/18 168 lb 4 oz (76.3 kg)   05/16/18 163 lb 8 oz (74.2 kg)   06/26/17 158 lb (71.7 kg)                  Patient Active Problem List   Diagnosis     CARDIOVASCULAR SCREENING; LDL GOAL LESS THAN 160     Anxiety attack     Generalized anxiety disorder     Controlled substance agreement signed     GERTRUDE (stress urinary incontinence, female)     Intractable migraine without aura and with status migrainosus     Menstrual headache     Menopausal disorder     Past Surgical History:   Procedure Laterality Date     H KIT ESSURE  2009    essrickey - Dr. Cailin Raymundo      HERNIORRHAPHY UMBILICAL  1974     SLING TRANSPUBO WITHOUT ANTERIOR COLPORRHAPHY N/A 11/21/2016    Procedure: SLING TRANSPUBO WITHOUT ANTERIOR COLPORRHAPHY;  Surgeon: Hernesto Berrios MD;  Location: [facility] OR       Social History   Substance Use Topics     Smoking status: Never Smoker     Smokeless tobacco: Never Used     Alcohol use No      Comment: 1 time per month     Family History   Problem Relation Age of Onset     Hypertension Mother      Depression Mother      Lipids Mother      Cardiovascular Mother      Circulatory Mother      Diabetes Mother       HEART DISEASE Mother      Cerebrovascular Disease Mother      Obesity Mother      C.A.D. Mother      Lung Cancer Mother      smoker     Eye Disorder Father      cone dystrophy     Macular Degeneration Father      Diabetes Maternal Aunt      type 2     Hypertension Maternal Aunt      HEART DISEASE Maternal Grandfather      HEART DISEASE Sister      high cholesterol       Macular Degeneration Sister          Current Outpatient Prescriptions   Medication Sig Dispense Refill     cetirizine (ZYRTEC) 10 MG tablet Take 10 mg by mouth daily       citalopram (CELEXA) 20 MG tablet Take 1 tablet (20 mg) by mouth daily 90 tablet 0     ibuprofen (ADVIL/MOTRIN) 800 MG tablet Take 1 tablet (800 mg) by mouth 3 times daily (with meals) For at least 5 days 60 tablet 1     Magnesium Oxide 140 MG CAPS Take 140 mg by mouth 2 times daily 120 capsule      norethindrone-ethinyl estradiol (HUE 1/20) 1-20 MG-MCG per tablet TAKE ONE TABLET BY MOUTH EVERY DAY. TAKE ONLY ACTIVE TABLETS 84 tablet 4     SUMAtriptan (IMITREX) 100 MG tablet Take 1 tablet (100 mg) by mouth at onset of headache for migraine May repeat in 2 hours. Max 2 tablets/24 hours. 9 tablet 1     [DISCONTINUED] citalopram (CELEXA) 20 MG tablet TAKE ONE TABLET BY MOUTH EVERY DAY 90 tablet 0     [DISCONTINUED] HUE 1/20 1-20 MG-MCG per tablet TAKE ONE TABLET BY MOUTH EVERY DAY. TAKE ONLY ACTIVE TABLETS 84 tablet 2       Patient under age 50, mutual decision reflected in health maintenance.      Pertinent mammograms are reviewed under the imaging tab.  History of abnormal Pap smear: NO - age 30-65 PAP every 5 years with negative HPV co-testing recommended  PAP / HPV 9/16/2014 3/28/2005   PAP NIL NIL     Reviewed and updated as needed this visit by clinical staff  Tobacco  Allergies  Meds  Problems  Med Hx  Surg Hx  Fam Hx  Soc Hx          Reviewed and updated as needed this visit by Provider  Tobacco  Allergies  Meds  Problems  Med Hx  Surg Hx  Fam Hx  Soc Hx        "  Past Medical History:   Diagnosis Date     Anxiety attack 9/16/2014     Encounter for Essure implantation 2009     Generalized anxiety disorder 9/16/2014    zoloft = flat emotions     Menopausal disorder     started on OCPs by menopause center 3/2017 (takes active continuously)     Menstrual headache     helped by OCPs and magnesium     GERTRUDE (stress urinary incontinence, female)     sling procedure 2016      Past Surgical History:   Procedure Laterality Date     H KIT ESSURE  2009    essure - Dr. Cailin Raymundo      HERNIORRHAPHY UMBILICAL  1974     SLING TRANSPUBO WITHOUT ANTERIOR COLPORRHAPHY N/A 11/21/2016    Procedure: SLING TRANSPUBO WITHOUT ANTERIOR COLPORRHAPHY;  Surgeon: Hernesto Berrios MD;  Location: [facility] OR       ROS:  CONSTITUTIONAL: NEGATIVE for fever, chills, change in weight  INTEGUMENTARU/SKIN: NEGATIVE for worrisome rashes, moles or lesions  EYES: NEGATIVE for vision changes or irritation  ENT: NEGATIVE for ear, mouth and throat problems  RESP: NEGATIVE for significant cough or SOB  BREAST: NEGATIVE for masses, tenderness or discharge  CV: NEGATIVE for chest pain, palpitations or peripheral edema  GI: NEGATIVE for nausea, abdominal pain, heartburn, or change in bowel habits  : NEGATIVE for unusual urinary or vaginal symptoms. Periods are absent due to continuous OCP use  MUSCULOSKELETAL: NEGATIVE for significant arthralgias or myalgia  NEURO: NEGATIVE for weakness, dizziness or paresthesias  PSYCHIATRIC: NEGATIVE for changes in mood or affect    OBJECTIVE:   /76  Pulse 97  Temp 98  F (36.7  C) (Tympanic)  Ht 5' 6.5\" (1.689 m)  Wt 168 lb 4 oz (76.3 kg)  SpO2 98%  BMI 26.75 kg/m2  EXAM:  GENERAL: healthy, alert and no distress  EYES: Eyes grossly normal to inspection, PERRL and conjunctivae and sclerae normal  HENT: ear canals and TM's normal, nose and mouth without ulcers or lesions  NECK: no adenopathy, no asymmetry, masses, or scars and thyroid normal to palpation  RESP: lungs clear " to auscultation - no rales, rhonchi or wheezes  BREAST: normal without masses, tenderness or nipple discharge and no palpable axillary masses or adenopathy  CV: regular rate and rhythm, normal S1 S2, no S3 or S4, no murmur, click or rub, no peripheral edema and peripheral pulses strong  ABDOMEN: soft, nontender, no hepatosplenomegaly, no masses and bowel sounds normal   (female): normal female external genitalia, normal urethral meatus, vaginal mucosa pink, moist, well rugated, and normal cervix/adnexa/uterus without masses or discharge  MS: no gross musculoskeletal defects noted, no edema  SKIN: no suspicious lesions or rashes  NEURO: Normal strength and tone, mentation intact and speech normal  PSYCH: mentation appears normal, affect normal/bright    Diagnostic Test Results:  No results found for this or any previous visit (from the past 24 hour(s)).    ASSESSMENT/PLAN:   Kassie was seen today for physical.    Diagnoses and all orders for this visit:    Routine general medical examination at a health care facility    Menopause, Other fatigue  Suspect for menopause/perimenopause contributing to her fatigue.  Will check for metabolic etiologies of fatigue and if all normal consider transition from citalopram to venlafaxine for stimulating effects of this medication.  -     TSH with free T4 reflex; Future  -     Ferritin; Future  -     Vitamin B12; Future  -     Vitamin D Deficiency; Future    Menstrual migraine without status migrainosus, not intractable  Markedly improved with OCP use and occasional Imitrex.  Continue current regimen suspect for perimenopause as a contributor to her  -     norethindrone-ethinyl estradiol (HUE 1/20) 1-20 MG-MCG per tablet; TAKE ONE TABLET BY MOUTH EVERY DAY. TAKE ONLY ACTIVE TABLETS    Generalized anxiety disorder  Well-controlled with citalopram.  Continue current regimen  -     citalopram (CELEXA) 20 MG tablet; Take 1 tablet (20 mg) by mouth daily    GERTRUDE (stress urinary  "incontinence, female)  Improved significantly since surgical procedure    Visit for screening mammogram, Screening for malignant neoplasm of cervix, Screening for HIV (human immunodeficiency virus), Screening for deficiency anemia, Lipid screening,   Screening for diabetes mellitus  Routine screening  -      MA SCREENING DIGITAL BILAT - Future  (s+30); Future  -     Pap imaged thin layer screen with HPV - recommended age 30 - 65 years (select HPV order below)  -     HIV Screening; Future  -     CBC with platelets; Future  -     Comprehensive metabolic panel; Future  -     Lipid panel reflex to direct LDL Fasting; Future        COUNSELING:   Reviewed preventive health counseling, as reflected in patient instructions       Regular exercise       Healthy diet/nutrition       (Chitra)menopause management    BP Readings from Last 1 Encounters:   07/25/18 112/76     Estimated body mass index is 26.75 kg/(m^2) as calculated from the following:    Height as of this encounter: 5' 6.5\" (1.689 m).    Weight as of this encounter: 168 lb 4 oz (76.3 kg).      Weight management plan: Discussed healthy diet and exercise guidelines and patient will follow up in 12 months in clinic to re-evaluate.     reports that she has never smoked. She has never used smokeless tobacco.      Counseling Resources:  ATP IV Guidelines  Pooled Cohorts Equation Calculator  Breast Cancer Risk Calculator  FRAX Risk Assessment  ICSI Preventive Guidelines  Dietary Guidelines for Americans, 2010  USDA's MyPlate  ASA Prophylaxis  Lung CA Screening    Mary Alice Colón PA-C  [facility]  "

## 2024-11-12 NOTE — DISCHARGE INSTRUCTIONS
yourself at home?  Activity  Rest as much as you need to after you go home.  You should be able to go back to your usual activities the day after the test.  Diet  Follow your doctor's directions for eating after the test.  Drink plenty of fluids (unless your doctor has told you not to).  Medications  If you have a sore throat the day after the test, use an over-the-counter spray to numb your throat.  Your doctor will tell you if and when you can restart your medicines. He or she will also give you instructions about taking any new medicines.  If you take blood thinners, such as warfarin (Coumadin), clopidogrel (Plavix), or aspirin, be sure to talk to your doctor. He or she will tell you if and when to start taking those medicines again. Make sure that you understand exactly what your doctor wants you to do.  If a biopsy was done during the test, your doctor may tell you not to take aspirin or other anti-inflammatory medicines for a few days. These include ibuprofen (Advil, Motrin) and naproxen (Aleve).  DO NOT DRINK  ALCOHOL TODAY.    Other instructions:Anesthesia  For your safety, do not drive or operate machinery for 24 hours.  Do not sign legal documents or make major decisions for 24 hours. The anesthesia can make it hard for you to fully understand what you are agreeing to.      Follow-up care is a key part of your treatment and safety. Be sure to make and go to all appointments, and call your doctor if you are having problems. It's also a good idea to know your test results and keep a list of the medicines you take.  When should you call for help?  [facility] -[phone] OR [facility] [phone]  Call 911 anytime you think you may need emergency care. For example, call if:  You passed out (lost consciousness).  You cough up blood.  You vomit blood or what looks like coffee grounds.  You pass maroon or very bloody stools.  Call your doctor now or seek

## 2024-11-14 LAB — SURGICAL PATHOLOGY REPORT: NORMAL

## 2024-11-20 ENCOUNTER — TELEPHONE (OUTPATIENT)
Dept: GASTROENTEROLOGY | Age: 44
End: 2024-11-20

## 2024-11-20 NOTE — TELEPHONE ENCOUNTER
----- Message from Mariella Sorenson, APRN - CNP sent at 11/18/2024  7:48 AM EST -----  Please notify patient that his lever enzymes are elevated but coming down a little; CBC is in normal range, Alpha 1 antitrypsin is normal at 139, liver kidney microsome 1.0, Ceruloplasmin 24, smooth muscle antibodies normal at 10, mitochondrial  antibodies 4.9, ELYSIA negative, GGT 23, Ferritin 363, PT and INR normal, and Iron is normal at 110

## 2024-12-05 PROBLEM — Z12.11 ENCOUNTER FOR SCREENING COLONOSCOPY: Status: RESOLVED | Noted: 2024-10-03 | Resolved: 2024-12-05

## 2024-12-06 ENCOUNTER — TELEPHONE (OUTPATIENT)
Dept: FAMILY MEDICINE CLINIC | Age: 44
End: 2024-12-06

## 2024-12-06 ENCOUNTER — OFFICE VISIT (OUTPATIENT)
Dept: FAMILY MEDICINE CLINIC | Age: 44
End: 2024-12-06

## 2024-12-06 VITALS
HEART RATE: 66 BPM | BODY MASS INDEX: 35.7 KG/M2 | WEIGHT: 263.6 LBS | OXYGEN SATURATION: 99 % | TEMPERATURE: 98.2 F | DIASTOLIC BLOOD PRESSURE: 76 MMHG | SYSTOLIC BLOOD PRESSURE: 150 MMHG | HEIGHT: 72 IN

## 2024-12-06 DIAGNOSIS — J06.9 UPPER RESPIRATORY TRACT INFECTION, UNSPECIFIED TYPE: Primary | ICD-10-CM

## 2024-12-06 RX ORDER — AZITHROMYCIN 250 MG/1
TABLET, FILM COATED ORAL
Qty: 1 PACKET | Refills: 0 | Status: SHIPPED | OUTPATIENT
Start: 2024-12-06 | End: 2024-12-06 | Stop reason: SDUPTHER

## 2024-12-06 RX ORDER — AZITHROMYCIN 250 MG/1
TABLET, FILM COATED ORAL
Qty: 1 PACKET | Refills: 0 | Status: SHIPPED | OUTPATIENT
Start: 2024-12-06

## 2024-12-06 RX ORDER — BENZONATATE 100 MG/1
100 CAPSULE ORAL 3 TIMES DAILY PRN
Qty: 20 CAPSULE | Refills: 0 | Status: SHIPPED | OUTPATIENT
Start: 2024-12-06

## 2024-12-06 RX ORDER — DEXTROMETHORPHAN HYDROBROMIDE AND PROMETHAZINE HYDROCHLORIDE 15; 6.25 MG/5ML; MG/5ML
5 SYRUP ORAL NIGHTLY PRN
Qty: 75 ML | Refills: 0 | Status: SHIPPED | OUTPATIENT
Start: 2024-12-06 | End: 2024-12-13

## 2024-12-06 NOTE — PROGRESS NOTES
Ronn Coronel   44 y.o.  male  [id number]      Chief Complaint   Patient presents with    Pharyngitis     More mucus, fatigue, runny nose,cough        Subjective:  44 y.o.male is here for a follow up. He has the following chronic/acute medical problems:  Patient Active Problem List   Diagnosis    GERD (gastroesophageal reflux disease)    Anxiety/depression    Migraine without status migrainosus, not intractable    Class 1 obesity due to excess calories with serious comorbidity and body mass index (BMI) of 34.0 to 34.9 in adult    LFT elevation-fatty liver    Borderline hypertension    Bloating       HPI   History of Present Illness  The patient presents for evaluation of cold symptoms.    He began experiencing a sore throat on Monday, which has since improved but recurs each morning. He has not measured his temperature but reports feeling feverish. He has been more fatigued than usual over the past few days. He also reports a mild headache today.    He has a cough and congestion, with productive coughing in the mornings. He also experiences postnasal drainage in the mornings, sneezing, and a runny nose. Additionally, he reports sinus pressure and pain.    He is not experiencing any shortness of breath, wheezing, or chest tightness. He also reports no gastrointestinal symptoms such as diarrhea, nausea, or vomiting.      Review of Systems See HPI    Current Outpatient Medications   Medication Sig Dispense Refill    dicyclomine (BENTYL) 10 MG capsule Take 1 capsule by mouth 4 times daily as needed (for abdominal cramping pain) 120 capsule 0    pantoprazole (PROTONIX) 40 MG tablet Take 1 tablet by mouth every morning (before breakfast) 90 tablet 1    Multiple Vitamins-Minerals (MULTIVITAMIN ADULT PO) Take by mouth       No current facility-administered medications for this visit.        Past medical, family,surgical history reviewed today.     Objective:  BP (!) 150/76 (Site: Left Upper Arm, Position: Sitting, Cuff

## 2024-12-09 NOTE — TELEPHONE ENCOUNTER
Patient called this provider on call to request another Rx for Z-Ildefonso be sent to the pharmacy.  He had been seen at the walk-in clinic where a Z-Ildefonso was prescribed.  Pharmacy informed patient that they did not receive the prescription.  Rx sent    KENZIE LOPEZ PA-C

## 2025-01-28 RX ORDER — DICYCLOMINE HYDROCHLORIDE 10 MG/1
10 CAPSULE ORAL 4 TIMES DAILY PRN
Qty: 120 CAPSULE | Refills: 0 | Status: ON HOLD | OUTPATIENT
Start: 2025-01-28

## 2025-02-02 ENCOUNTER — HOSPITAL ENCOUNTER (INPATIENT)
Age: 45
LOS: 2 days | Discharge: HOME | DRG: 872 | End: 2025-02-04
Attending: EMERGENCY MEDICINE | Admitting: STUDENT IN AN ORGANIZED HEALTH CARE EDUCATION/TRAINING PROGRAM
Payer: COMMERCIAL

## 2025-02-02 ENCOUNTER — APPOINTMENT (OUTPATIENT)
Dept: CT IMAGING | Age: 45
DRG: 872 | End: 2025-02-02
Payer: COMMERCIAL

## 2025-02-02 DIAGNOSIS — K57.92 DIVERTICULITIS: ICD-10-CM

## 2025-02-02 DIAGNOSIS — K57.92 ACUTE DIVERTICULITIS: Primary | ICD-10-CM

## 2025-02-02 LAB
ALBUMIN SERPL-MCNC: 4.2 G/DL (ref 3.4–5)
ALBUMIN/GLOB SERPL: 1.2 {RATIO} (ref 1.1–2.2)
ALP SERPL-CCNC: 101 U/L (ref 40–129)
ALT SERPL-CCNC: 99 U/L (ref 10–40)
ANION GAP SERPL CALCULATED.3IONS-SCNC: 13 MMOL/L (ref 9–17)
AST SERPL-CCNC: 54 U/L (ref 15–37)
BASOPHILS # BLD: 0.04 K/UL
BASOPHILS NFR BLD: 0 % (ref 0–1)
BILIRUB SERPL-MCNC: 0.8 MG/DL (ref 0–1)
BILIRUB UR QL STRIP: NEGATIVE
BUN SERPL-MCNC: 12 MG/DL (ref 7–20)
CALCIUM SERPL-MCNC: 9.3 MG/DL (ref 8.3–10.6)
CHLORIDE SERPL-SCNC: 99 MMOL/L (ref 99–110)
CLARITY UR: CLEAR
CO2 SERPL-SCNC: 25 MMOL/L (ref 21–32)
COLOR UR: YELLOW
COMMENT: ABNORMAL
CREAT SERPL-MCNC: 1.3 MG/DL (ref 0.9–1.3)
EOSINOPHIL # BLD: 0.09 K/UL
EOSINOPHILS RELATIVE PERCENT: 1 % (ref 0–3)
ERYTHROCYTE [DISTWIDTH] IN BLOOD BY AUTOMATED COUNT: 12.9 % (ref 11.7–14.9)
GFR, ESTIMATED: 62 ML/MIN/1.73M2
GLUCOSE SERPL-MCNC: 115 MG/DL (ref 74–99)
GLUCOSE UR STRIP-MCNC: NEGATIVE MG/DL
HCT VFR BLD AUTO: 47.2 % (ref 42–52)
HGB BLD-MCNC: 16 G/DL (ref 13.5–18)
HGB UR QL STRIP.AUTO: NEGATIVE
IMM GRANULOCYTES # BLD AUTO: 0.05 K/UL
IMM GRANULOCYTES NFR BLD: 0 %
KETONES UR STRIP-MCNC: NEGATIVE MG/DL
LEUKOCYTE ESTERASE UR QL STRIP: NEGATIVE
LYMPHOCYTES NFR BLD: 3.19 K/UL
LYMPHOCYTES RELATIVE PERCENT: 23 % (ref 24–44)
MCH RBC QN AUTO: 30.2 PG (ref 27–31)
MCHC RBC AUTO-ENTMCNC: 33.9 G/DL (ref 32–36)
MCV RBC AUTO: 89.2 FL (ref 78–100)
MONOCYTES NFR BLD: 1.59 K/UL
MONOCYTES NFR BLD: 11 % (ref 0–4)
NEUTROPHILS NFR BLD: 65 % (ref 36–66)
NEUTS SEG NFR BLD: 9.05 K/UL
NITRITE UR QL STRIP: NEGATIVE
PH UR STRIP: 6 [PH] (ref 5–8)
PLATELET # BLD AUTO: 241 K/UL (ref 140–440)
PMV BLD AUTO: 9.5 FL (ref 7.5–11.1)
POTASSIUM SERPL-SCNC: 4.1 MMOL/L (ref 3.5–5.1)
PROT SERPL-MCNC: 7.7 G/DL (ref 6.4–8.2)
PROT UR STRIP-MCNC: NEGATIVE MG/DL
RBC # BLD AUTO: 5.29 M/UL (ref 4.6–6.2)
SODIUM SERPL-SCNC: 138 MMOL/L (ref 136–145)
SP GR UR STRIP: <1.005 (ref 1–1.03)
UROBILINOGEN UR STRIP-ACNC: 0.2 EU/DL (ref 0–1)
WBC OTHER # BLD: 14 K/UL (ref 4–10.5)

## 2025-02-02 PROCEDURE — 99285 EMERGENCY DEPT VISIT HI MDM: CPT

## 2025-02-02 PROCEDURE — 85025 COMPLETE CBC W/AUTO DIFF WBC: CPT

## 2025-02-02 PROCEDURE — 2580000003 HC RX 258: Performed by: STUDENT IN AN ORGANIZED HEALTH CARE EDUCATION/TRAINING PROGRAM

## 2025-02-02 PROCEDURE — 6360000004 HC RX CONTRAST MEDICATION: Performed by: EMERGENCY MEDICINE

## 2025-02-02 PROCEDURE — 6360000002 HC RX W HCPCS: Performed by: STUDENT IN AN ORGANIZED HEALTH CARE EDUCATION/TRAINING PROGRAM

## 2025-02-02 PROCEDURE — 6360000002 HC RX W HCPCS: Performed by: SURGERY

## 2025-02-02 PROCEDURE — 74177 CT ABD & PELVIS W/CONTRAST: CPT

## 2025-02-02 PROCEDURE — 80053 COMPREHEN METABOLIC PANEL: CPT

## 2025-02-02 PROCEDURE — 2580000003 HC RX 258: Performed by: EMERGENCY MEDICINE

## 2025-02-02 PROCEDURE — 1200000000 HC SEMI PRIVATE

## 2025-02-02 PROCEDURE — 81003 URINALYSIS AUTO W/O SCOPE: CPT

## 2025-02-02 RX ORDER — IOPAMIDOL 755 MG/ML
75 INJECTION, SOLUTION INTRAVASCULAR
Status: COMPLETED | OUTPATIENT
Start: 2025-02-02 | End: 2025-02-02

## 2025-02-02 RX ORDER — METRONIDAZOLE 500 MG/100ML
500 INJECTION, SOLUTION INTRAVENOUS ONCE
Status: DISCONTINUED | OUTPATIENT
Start: 2025-02-02 | End: 2025-02-02

## 2025-02-02 RX ORDER — HYDROMORPHONE HYDROCHLORIDE 1 MG/ML
1 INJECTION, SOLUTION INTRAMUSCULAR; INTRAVENOUS; SUBCUTANEOUS EVERY 6 HOURS PRN
Status: DISCONTINUED | OUTPATIENT
Start: 2025-02-02 | End: 2025-02-03

## 2025-02-02 RX ORDER — SODIUM CHLORIDE 9 MG/ML
INJECTION, SOLUTION INTRAVENOUS CONTINUOUS
Status: DISCONTINUED | OUTPATIENT
Start: 2025-02-02 | End: 2025-02-03

## 2025-02-02 RX ORDER — CIPROFLOXACIN 2 MG/ML
400 INJECTION, SOLUTION INTRAVENOUS ONCE
Status: DISCONTINUED | OUTPATIENT
Start: 2025-02-02 | End: 2025-02-02

## 2025-02-02 RX ADMIN — IOPAMIDOL 75 ML: 755 INJECTION, SOLUTION INTRAVENOUS at 20:06

## 2025-02-02 RX ADMIN — HYDROMORPHONE HYDROCHLORIDE 1 MG: 1 INJECTION, SOLUTION INTRAMUSCULAR; INTRAVENOUS; SUBCUTANEOUS at 22:42

## 2025-02-02 RX ADMIN — PIPERACILLIN AND TAZOBACTAM 3375 MG: 3; .375 INJECTION, POWDER, LYOPHILIZED, FOR SOLUTION INTRAVENOUS at 22:43

## 2025-02-02 RX ADMIN — SODIUM CHLORIDE: 9 INJECTION, SOLUTION INTRAVENOUS at 22:42

## 2025-02-02 ASSESSMENT — PAIN DESCRIPTION - PAIN TYPE: TYPE: ACUTE PAIN

## 2025-02-02 ASSESSMENT — PAIN DESCRIPTION - FREQUENCY: FREQUENCY: CONTINUOUS

## 2025-02-02 ASSESSMENT — PAIN DESCRIPTION - ONSET: ONSET: ON-GOING

## 2025-02-02 ASSESSMENT — PAIN SCALES - GENERAL
PAINLEVEL_OUTOF10: 9
PAINLEVEL_OUTOF10: 6

## 2025-02-02 ASSESSMENT — PAIN DESCRIPTION - DESCRIPTORS: DESCRIPTORS: ACHING;DISCOMFORT

## 2025-02-02 ASSESSMENT — PAIN DESCRIPTION - LOCATION: LOCATION: ABDOMEN

## 2025-02-02 ASSESSMENT — PAIN DESCRIPTION - ORIENTATION: ORIENTATION: LEFT

## 2025-02-02 NOTE — ED PROVIDER NOTES
Emergency Department Encounter    Patient: Ronn Coronel  MRN: [id number]  : 1980  Date of Evaluation: 2025  ED Provider:  Delvin Cantu MD    Triage Chief Complaint:   Abdominal Pain (X2 days R sided upper and lower )    [place]:  Ronn Coronel is a 44 y.o. male history of GERD that presents to emergency department with acute nontraumatic persistent left lower quadrant abdominal pain since this morning.  Patient states yesterday he just was not feeling well and had some nausea and episode of vomiting.  He states his he has a has \"the flu.\"  He does not admit to fever or chills or cough or runny nose.  Patient denies chest pain.  Patient denies inflammatory bowel disease  He has no known history of diverticulosis.  He denies dysuria.    ROS - see HPI, below listed is current ROS at time of my eval:  General:  No fevers, no chills, no weakness  ENT:  No sore throat, no nasal congestion, no hearing changes  Cardiovascular:  No chest pain, no palpitations  Respiratory:  No shortness of breath, no cough, no wheezing  Gastrointestinal: Left lower quadrant abdominal pain with an episode of nausea and vomiting yesterday  Musculoskeletal:  No muscle pain, no joint pain  Skin:  No rash, no pruritis, no easy bruising  Neurologic:  No speech problems, no headache, no extremity numbness, no extremity tingling, no extremity weakness  Psychiatric:  No anxiety  Genitourinary:  No dysuria, no hematuria      Past Medical History:   Diagnosis Date    GERD (gastroesophageal reflux disease)      Past Surgical History:   Procedure Laterality Date    COLONOSCOPY N/A 2024    COLONOSCOPY POLYPECTOMY SNARE/BIOPSY performed by Jac Chi MD at [facility] ENDOSCOPY    UPPER GASTROINTESTINAL ENDOSCOPY N/A 2024    ESOPHAGOGASTRODUODENOSCOPY BIOPSY performed by Jac Chi MD at [facility] ENDOSCOPY    VASECTOMY  2017     No family history on file.  Social History     Socioeconomic History    Marital status:

## 2025-02-03 LAB
ALBUMIN SERPL-MCNC: 3.8 G/DL (ref 3.4–5)
ALBUMIN/GLOB SERPL: 1.2 {RATIO} (ref 1.1–2.2)
ALP SERPL-CCNC: 66 U/L (ref 40–129)
ALT SERPL-CCNC: 86 U/L (ref 10–40)
ANION GAP SERPL CALCULATED.3IONS-SCNC: 9 MMOL/L (ref 9–17)
AST SERPL-CCNC: 35 U/L (ref 15–37)
BASOPHILS # BLD: 0.02 K/UL
BASOPHILS NFR BLD: 0 % (ref 0–1)
BILIRUB SERPL-MCNC: 0.7 MG/DL (ref 0–1)
BUN SERPL-MCNC: 13 MG/DL (ref 7–20)
CALCIUM SERPL-MCNC: 8.9 MG/DL (ref 8.3–10.6)
CHLORIDE SERPL-SCNC: 104 MMOL/L (ref 99–110)
CO2 SERPL-SCNC: 25 MMOL/L (ref 21–32)
CREAT SERPL-MCNC: 1 MG/DL (ref 0.9–1.3)
EOSINOPHIL # BLD: 0.2 K/UL
EOSINOPHILS RELATIVE PERCENT: 2 % (ref 0–3)
ERYTHROCYTE [DISTWIDTH] IN BLOOD BY AUTOMATED COUNT: 12.9 % (ref 11.7–14.9)
GFR, ESTIMATED: 84 ML/MIN/1.73M2
GLUCOSE SERPL-MCNC: 94 MG/DL (ref 74–99)
HCT VFR BLD AUTO: 46.2 % (ref 42–52)
HGB BLD-MCNC: 15.3 G/DL (ref 13.5–18)
IMM GRANULOCYTES # BLD AUTO: 0.02 K/UL
IMM GRANULOCYTES NFR BLD: 0 %
LACTATE BLDV-SCNC: 0.8 MMOL/L (ref 0.4–2)
LYMPHOCYTES NFR BLD: 2.44 K/UL
LYMPHOCYTES RELATIVE PERCENT: 27 % (ref 24–44)
MCH RBC QN AUTO: 30.2 PG (ref 27–31)
MCHC RBC AUTO-ENTMCNC: 33.1 G/DL (ref 32–36)
MCV RBC AUTO: 91.3 FL (ref 78–100)
MONOCYTES NFR BLD: 1.19 K/UL
MONOCYTES NFR BLD: 13 % (ref 0–4)
NEUTROPHILS NFR BLD: 57 % (ref 36–66)
NEUTS SEG NFR BLD: 5.16 K/UL
PLATELET # BLD AUTO: 217 K/UL (ref 140–440)
PMV BLD AUTO: 10 FL (ref 7.5–11.1)
POTASSIUM SERPL-SCNC: 4.1 MMOL/L (ref 3.5–5.1)
PROT SERPL-MCNC: 6.8 G/DL (ref 6.4–8.2)
RBC # BLD AUTO: 5.06 M/UL (ref 4.6–6.2)
SODIUM SERPL-SCNC: 137 MMOL/L (ref 136–145)
WBC OTHER # BLD: 9 K/UL (ref 4–10.5)

## 2025-02-03 PROCEDURE — 36415 COLL VENOUS BLD VENIPUNCTURE: CPT

## 2025-02-03 PROCEDURE — 94761 N-INVAS EAR/PLS OXIMETRY MLT: CPT

## 2025-02-03 PROCEDURE — 85025 COMPLETE CBC W/AUTO DIFF WBC: CPT

## 2025-02-03 PROCEDURE — 1200000000 HC SEMI PRIVATE

## 2025-02-03 PROCEDURE — 87040 BLOOD CULTURE FOR BACTERIA: CPT

## 2025-02-03 PROCEDURE — 80053 COMPREHEN METABOLIC PANEL: CPT

## 2025-02-03 PROCEDURE — 6360000002 HC RX W HCPCS: Performed by: STUDENT IN AN ORGANIZED HEALTH CARE EDUCATION/TRAINING PROGRAM

## 2025-02-03 PROCEDURE — 2580000003 HC RX 258: Performed by: STUDENT IN AN ORGANIZED HEALTH CARE EDUCATION/TRAINING PROGRAM

## 2025-02-03 PROCEDURE — 83605 ASSAY OF LACTIC ACID: CPT

## 2025-02-03 PROCEDURE — 6370000000 HC RX 637 (ALT 250 FOR IP): Performed by: STUDENT IN AN ORGANIZED HEALTH CARE EDUCATION/TRAINING PROGRAM

## 2025-02-03 RX ORDER — ENOXAPARIN SODIUM 100 MG/ML
30 INJECTION SUBCUTANEOUS 2 TIMES DAILY
Status: DISCONTINUED | OUTPATIENT
Start: 2025-02-03 | End: 2025-02-04 | Stop reason: HOSPADM

## 2025-02-03 RX ORDER — PANTOPRAZOLE SODIUM 40 MG/1
40 TABLET, DELAYED RELEASE ORAL
Status: CANCELLED | OUTPATIENT
Start: 2025-02-03

## 2025-02-03 RX ORDER — SODIUM CHLORIDE 9 MG/ML
INJECTION, SOLUTION INTRAVENOUS CONTINUOUS
Status: DISPENSED | OUTPATIENT
Start: 2025-02-03 | End: 2025-02-03

## 2025-02-03 RX ORDER — PANTOPRAZOLE SODIUM 40 MG/1
40 TABLET, DELAYED RELEASE ORAL
Status: DISCONTINUED | OUTPATIENT
Start: 2025-02-03 | End: 2025-02-04 | Stop reason: HOSPADM

## 2025-02-03 RX ORDER — ACETAMINOPHEN 650 MG/1
650 SUPPOSITORY RECTAL EVERY 6 HOURS PRN
Status: DISCONTINUED | OUTPATIENT
Start: 2025-02-03 | End: 2025-02-04 | Stop reason: HOSPADM

## 2025-02-03 RX ORDER — ONDANSETRON 2 MG/ML
4 INJECTION INTRAMUSCULAR; INTRAVENOUS EVERY 8 HOURS PRN
Status: DISCONTINUED | OUTPATIENT
Start: 2025-02-03 | End: 2025-02-04 | Stop reason: HOSPADM

## 2025-02-03 RX ORDER — DICYCLOMINE HYDROCHLORIDE 10 MG/1
10 CAPSULE ORAL 4 TIMES DAILY PRN
Qty: 120 CAPSULE | Refills: 0 | Status: SHIPPED | OUTPATIENT
Start: 2025-02-03 | End: 2025-02-04 | Stop reason: HOSPADM

## 2025-02-03 RX ORDER — OXYCODONE HYDROCHLORIDE 5 MG/1
5 TABLET ORAL EVERY 6 HOURS PRN
Status: DISCONTINUED | OUTPATIENT
Start: 2025-02-03 | End: 2025-02-04 | Stop reason: HOSPADM

## 2025-02-03 RX ORDER — ACETAMINOPHEN 325 MG/1
650 TABLET ORAL EVERY 6 HOURS PRN
Status: DISCONTINUED | OUTPATIENT
Start: 2025-02-03 | End: 2025-02-04 | Stop reason: HOSPADM

## 2025-02-03 RX ORDER — HYDROMORPHONE HYDROCHLORIDE 1 MG/ML
0.5 INJECTION, SOLUTION INTRAMUSCULAR; INTRAVENOUS; SUBCUTANEOUS EVERY 4 HOURS PRN
Status: COMPLETED | OUTPATIENT
Start: 2025-02-03 | End: 2025-02-03

## 2025-02-03 RX ADMIN — SODIUM CHLORIDE: 9 INJECTION, SOLUTION INTRAVENOUS at 01:33

## 2025-02-03 RX ADMIN — PIPERACILLIN AND TAZOBACTAM 3375 MG: 3; .375 INJECTION, POWDER, LYOPHILIZED, FOR SOLUTION INTRAVENOUS at 13:16

## 2025-02-03 RX ADMIN — ENOXAPARIN SODIUM 30 MG: 100 INJECTION SUBCUTANEOUS at 21:54

## 2025-02-03 RX ADMIN — HYDROMORPHONE HYDROCHLORIDE 0.5 MG: 1 INJECTION, SOLUTION INTRAMUSCULAR; INTRAVENOUS; SUBCUTANEOUS at 11:44

## 2025-02-03 RX ADMIN — OXYCODONE HYDROCHLORIDE 5 MG: 5 TABLET ORAL at 01:32

## 2025-02-03 RX ADMIN — HYDROMORPHONE HYDROCHLORIDE 0.5 MG: 1 INJECTION, SOLUTION INTRAMUSCULAR; INTRAVENOUS; SUBCUTANEOUS at 22:40

## 2025-02-03 RX ADMIN — PIPERACILLIN AND TAZOBACTAM 3375 MG: 3; .375 INJECTION, POWDER, LYOPHILIZED, FOR SOLUTION INTRAVENOUS at 22:12

## 2025-02-03 RX ADMIN — OXYCODONE HYDROCHLORIDE 5 MG: 5 TABLET ORAL at 07:46

## 2025-02-03 RX ADMIN — HYDROMORPHONE HYDROCHLORIDE 0.5 MG: 1 INJECTION, SOLUTION INTRAMUSCULAR; INTRAVENOUS; SUBCUTANEOUS at 03:54

## 2025-02-03 RX ADMIN — PIPERACILLIN AND TAZOBACTAM 3375 MG: 3; .375 INJECTION, POWDER, LYOPHILIZED, FOR SOLUTION INTRAVENOUS at 07:49

## 2025-02-03 RX ADMIN — ENOXAPARIN SODIUM 30 MG: 100 INJECTION SUBCUTANEOUS at 01:31

## 2025-02-03 ASSESSMENT — ENCOUNTER SYMPTOMS
BACK PAIN: 0
BLOOD IN STOOL: 0
ABDOMINAL PAIN: 1
CONSTIPATION: 0
WHEEZING: 0
NAUSEA: 1
SHORTNESS OF BREATH: 0
COLOR CHANGE: 0
CHEST TIGHTNESS: 0
DIARRHEA: 0
ABDOMINAL DISTENTION: 0

## 2025-02-03 ASSESSMENT — PAIN SCALES - GENERAL
PAINLEVEL_OUTOF10: 6
PAINLEVEL_OUTOF10: 7
PAINLEVEL_OUTOF10: 7
PAINLEVEL_OUTOF10: 5

## 2025-02-03 ASSESSMENT — PAIN DESCRIPTION - LOCATION
LOCATION: ABDOMEN

## 2025-02-03 ASSESSMENT — PAIN DESCRIPTION - DESCRIPTORS
DESCRIPTORS: ACHING
DESCRIPTORS: SHARP;ACHING
DESCRIPTORS: SHARP
DESCRIPTORS: SHARP

## 2025-02-03 ASSESSMENT — PAIN DESCRIPTION - ORIENTATION
ORIENTATION: LOWER;LEFT
ORIENTATION: LEFT
ORIENTATION: LEFT
ORIENTATION: LEFT;LOWER

## 2025-02-03 ASSESSMENT — PAIN - FUNCTIONAL ASSESSMENT
PAIN_FUNCTIONAL_ASSESSMENT: ACTIVITIES ARE NOT PREVENTED
PAIN_FUNCTIONAL_ASSESSMENT: ACTIVITIES ARE NOT PREVENTED
PAIN_FUNCTIONAL_ASSESSMENT: PREVENTS OR INTERFERES SOME ACTIVE ACTIVITIES AND ADLS
PAIN_FUNCTIONAL_ASSESSMENT: ACTIVITIES ARE NOT PREVENTED
PAIN_FUNCTIONAL_ASSESSMENT: ACTIVITIES ARE NOT PREVENTED

## 2025-02-03 ASSESSMENT — PAIN DESCRIPTION - ONSET: ONSET: ON-GOING

## 2025-02-03 ASSESSMENT — PAIN DESCRIPTION - FREQUENCY: FREQUENCY: CONTINUOUS

## 2025-02-03 ASSESSMENT — PAIN DESCRIPTION - PAIN TYPE: TYPE: ACUTE PAIN

## 2025-02-03 NOTE — CARE COORDINATION
Reviewed medical record and discussed pt in IDR to initiate d/c planning. Per Dr. Izquierdo, pt should be ready for d/c today vs tomorrow. Pt from home. Independent. Is employed, has PCP/insurance. No need's id'd. CM available should needs arise.

## 2025-02-03 NOTE — PROGRESS NOTES
Surgery update note:    Patient's pain improving and he is passing flatus. Patient appropriate to start Clear Liquid diet.    Moustapha Mayes MD  General Surgery, PGY4  [facility]

## 2025-02-03 NOTE — ED NOTES
ED TO INPATIENT SBAR HANDOFF    Patient Name: Ronn Coronel   Preferred Name: Ronn  : 1980  44 y.o.   Family/Caregiver Present: no   Code Status Order: Prior  PO Status: NPO:Yes  Telemetry Order:   C-SSRS: Risk of Suicide: No Risk  Sitter no     Restraints:     Sepsis Risk Score      Situation  Chief Complaint   Patient presents with    Abdominal Pain     X2 days R sided upper and lower      Brief Description of Patient's Condition: Pt presents to ED with complaints of abdominal pain. Being admitted for acute diverticulitis. Surgery consulted.   Mental Status: oriented and alert  Arrived from:Home  Imaging:   CT ABDOMEN PELVIS W IV CONTRAST Additional Contrast? None   Final Result        Abnormal labs:   Abnormal Labs Reviewed   CBC WITH AUTO DIFFERENTIAL - Abnormal; Notable for the following components:       Result Value    WBC 14.0 (*)     Lymphocytes % 23 (*)     Monocytes % 11 (*)     All other components within normal limits   URINALYSIS - Abnormal; Notable for the following components:    Specific Gravity, UA <1.005 (*)     All other components within normal limits   COMPREHENSIVE METABOLIC PANEL - Abnormal; Notable for the following components:    Glucose 115 (*)     ALT 99 (*)     AST 54 (*)     All other components within normal limits       Background  Allergies:   Allergies   Allergen Reactions    Bactrim [Sulfamethoxazole-Trimethoprim]     Sulfa Antibiotics Other (See Comments)     Doesn't recall reaction but had to be seen in ED     History:   Past Medical History:   Diagnosis Date    GERD (gastroesophageal reflux disease)        Assessment  Vitals:        Vitals:    25 1712 25 1722 25 1832 25 1842   BP: (!) 133/92 130/81 (!) 142/88 (!) 143/89   Pulse:   82 86   Resp:   18 16   Temp:       TempSrc:       SpO2:    94%   Weight:         Deterioration Index (DI): Deterioration Index: 18.64  Deterioration Index (DI) Interventions Performed:    O2 Flow Rate:    O2 Device:

## 2025-02-03 NOTE — PROGRESS NOTES
4 Eyes Skin Assessment     NAME:  Ronn Coronel  YOB: 1980  MEDICAL RECORD NUMBER:  [id number]    The patient is being assessed for  Admission    I agree that at least one RN has performed a thorough Head to Toe Skin Assessment on the patient. ALL assessment sites listed below have been assessed.      Areas assessed by both nurses:    Head, Face, Ears, Shoulders, Back, Chest, Arms, Elbows, Hands, Sacrum. Buttock, Coccyx, Ischium, Legs. Feet and Heels, and Under Medical Devices         Does the Patient have a Wound? No noted wound(s)       Thai Prevention initiated by RN: No  Wound Care Orders initiated by RN: No    Pressure Injury (Stage 3,4, Unstageable, DTI, NWPT, and Complex wounds) if present, place Wound referral order by RN under : No    New Ostomies, if present place, Ostomy referral order under : No     Nurse 1 eSignature: Electronically signed by Juany Davidson LPN on 2/3/25 at 2:54 AM EST    **SHARE this note so that the co-signing nurse can place an eSignature**    Nurse 2 eSignature: Electronically signed by May Beard RN on 2/4/25 at 9:53 AM EST    no edema,  no murmurs,  regular rate and rhythm

## 2025-02-03 NOTE — PROGRESS NOTES
[place]  [facility] [place]      Name:  Ronn Coronel /Age/Sex: 1980  (44 y.o. male)   MRN & CSN:  [id number] & [id number] Encounter Date/Time: 2/3/2025 12:17 PM EST    Location:  [facility]/1105-A PCP: Eran Schultz MD       Hospital Day: 2      Subjective:     Chief Complaint:  Abdominal Pain (X2 days R sided upper and lower )     Still has pain but improved.   No sign of GI bleed.   Diet advanced to CLD by general surgery     Assessment and Plan:   Sepsis 2/2 acute Diverticulitis  CT Abdomen and pelvis reviewed diverticulitis involving descending colon  Colonoscopy 2024 2 sessile polyps in transverse colon, internal hemorrhoids grade 1  Received IVF  - now on CLD  - general surgery consulted   - on Zosyn  - f/u blood cxX2      Abnormal LFTs  Stable trend secondary to fatty liver disease     GERD  Continue home Protonix      Personally reviewed Lab Studies and Imaging     Imaging that was interpreted personally includes CT abd  and results as above    Drugs that require monitoring for toxicity include lovenox and the method of monitoring was cbc      Diet ADULT DIET; Clear Liquid   DVT Prophylaxis [x] Lovenox, []  Heparin, [] SCDs, [] Ambulation,  [] Eliquis, [] Xarelto  [] Coumadin   Code Status Full Code   Disposition From: home  Expected Disposition: home  Estimated Date of Discharge: 1-2 days  Patient requires continued admission due to surgery clearance   Surrogate Decision Maker/ POA      Review of Systems:    Review of Systems    See above    Objective:     Intake/Output Summary (Last 24 hours) at 2/3/2025 1217  Last data filed at 2/3/2025 0949  Gross per 24 hour   Intake 0 ml   Output --   Net 0 ml        Vitals:   Vitals:    25 1205   BP:    Pulse:    Resp:    Temp:    SpO2: 93%       Physical Exam:     General: NAD  Eyes: EOMI  ENT: neck supple  Cardiovascular: Regular rate.  Respiratory: Clear to auscultation  Gastrointestinal: Soft, tenderness LLQ  Genitourinary: no

## 2025-02-03 NOTE — CONSULTS
Department of GeneralSurgery   Surgical Service Dr Wilson   Consult Note    Date of Consult: 2/3/25    Reason for Consult:  Uncomplicated Diverticulitis  Requesting Physician:  Dr Izquierdo    CHIEF COMPLAINT:  Left lower quadrant abdominal pain    History Obtained From:  patient    HISTORY OF PRESENT ILLNESS:      Ronn Coronel is a 44 y.o. male with GERD, Fatty liver disease, obesity presented from home with worsening LLQ abdominal pain.  The pain is dull with intermittent sharp, nonradiating, LLQ pain that is constantly worsening for 2 days. He has had an episode of non-bloody, non-bilious emesis with the pain. He had a BM yesterday. He states that he has also had consistent right lower quadrant pain and indicated his right groin for several months as well for which he underwent EGD and Colonoscopy on 11/12/24. EGD demonstrated esophagitis, gastritis and hiatal hernia. Colonoscopy found 2 polyps in transverse colon and otherwise normal. He denies prior abdominal surgical history. No anticoagulation or anti-platelet medications.     On evaluation in the ED, patient found to have leukocytosis which is now down-trending since Zosyn started 9.0 (14.0). CT abd/pelvis demonstrates diverticuli throughout colon with luminal thickening in the descending colon, with no evidence of perforation of abscess. Bilateral inguinal hernia and umbilical hernia also seen on imaging.      Past Medical History:    Past Medical History:   Diagnosis Date    GERD (gastroesophageal reflux disease)        Past Surgical History:    Past Surgical History:   Procedure Laterality Date    COLONOSCOPY N/A 11/12/2024    COLONOSCOPY POLYPECTOMY SNARE/BIOPSY performed by Jac Chi MD at [facility] ENDOSCOPY    UPPER GASTROINTESTINAL ENDOSCOPY N/A 11/12/2024    ESOPHAGOGASTRODUODENOSCOPY BIOPSY performed by Jac Chi MD at [facility] ENDOSCOPY    VASECTOMY  2017       Current Medications:   Current Facility-Administered Medications   Medication Dose

## 2025-02-04 VITALS
RESPIRATION RATE: 16 BRPM | SYSTOLIC BLOOD PRESSURE: 149 MMHG | HEIGHT: 72 IN | HEART RATE: 65 BPM | OXYGEN SATURATION: 93 % | BODY MASS INDEX: 35.21 KG/M2 | WEIGHT: 260 LBS | DIASTOLIC BLOOD PRESSURE: 96 MMHG | TEMPERATURE: 98.6 F

## 2025-02-04 PROCEDURE — 94761 N-INVAS EAR/PLS OXIMETRY MLT: CPT

## 2025-02-04 PROCEDURE — 6370000000 HC RX 637 (ALT 250 FOR IP): Performed by: STUDENT IN AN ORGANIZED HEALTH CARE EDUCATION/TRAINING PROGRAM

## 2025-02-04 PROCEDURE — 2580000003 HC RX 258: Performed by: STUDENT IN AN ORGANIZED HEALTH CARE EDUCATION/TRAINING PROGRAM

## 2025-02-04 PROCEDURE — 6360000002 HC RX W HCPCS: Performed by: STUDENT IN AN ORGANIZED HEALTH CARE EDUCATION/TRAINING PROGRAM

## 2025-02-04 RX ORDER — OXYCODONE HYDROCHLORIDE 5 MG/1
5 TABLET ORAL EVERY 6 HOURS PRN
Qty: 12 TABLET | Refills: 0 | Status: SHIPPED | OUTPATIENT
Start: 2025-02-04 | End: 2025-02-07

## 2025-02-04 RX ORDER — POLYETHYLENE GLYCOL 3350 17 G/17G
17 POWDER, FOR SOLUTION ORAL 2 TIMES DAILY
Qty: 527 G | Refills: 1 | Status: SHIPPED | OUTPATIENT
Start: 2025-02-04 | End: 2025-03-06

## 2025-02-04 RX ORDER — SENNOSIDES A AND B 8.6 MG/1
1 TABLET, FILM COATED ORAL 2 TIMES DAILY
Status: DISCONTINUED | OUTPATIENT
Start: 2025-02-04 | End: 2025-02-04 | Stop reason: HOSPADM

## 2025-02-04 RX ORDER — POLYETHYLENE GLYCOL 3350 17 G/17G
17 POWDER, FOR SOLUTION ORAL 2 TIMES DAILY
Status: DISCONTINUED | OUTPATIENT
Start: 2025-02-04 | End: 2025-02-04 | Stop reason: HOSPADM

## 2025-02-04 RX ORDER — METRONIDAZOLE 500 MG/1
500 TABLET ORAL 3 TIMES DAILY
Qty: 21 TABLET | Refills: 0 | Status: SHIPPED | OUTPATIENT
Start: 2025-02-04 | End: 2025-02-11

## 2025-02-04 RX ORDER — CIPROFLOXACIN 500 MG/1
500 TABLET, FILM COATED ORAL 2 TIMES DAILY
Qty: 14 TABLET | Refills: 0 | Status: SHIPPED | OUTPATIENT
Start: 2025-02-04 | End: 2025-02-11

## 2025-02-04 RX ADMIN — PIPERACILLIN AND TAZOBACTAM 3375 MG: 3; .375 INJECTION, POWDER, LYOPHILIZED, FOR SOLUTION INTRAVENOUS at 12:45

## 2025-02-04 RX ADMIN — SENNOSIDES 8.6 MG: 8.6 TABLET, COATED ORAL at 12:38

## 2025-02-04 RX ADMIN — POLYETHYLENE GLYCOL (3350) 17 G: 17 POWDER, FOR SOLUTION ORAL at 12:38

## 2025-02-04 RX ADMIN — PANTOPRAZOLE SODIUM 40 MG: 40 TABLET, DELAYED RELEASE ORAL at 05:34

## 2025-02-04 RX ADMIN — PIPERACILLIN AND TAZOBACTAM 3375 MG: 3; .375 INJECTION, POWDER, LYOPHILIZED, FOR SOLUTION INTRAVENOUS at 05:46

## 2025-02-04 ASSESSMENT — PAIN SCALES - GENERAL: PAINLEVEL_OUTOF10: 0

## 2025-02-04 NOTE — DISCHARGE INSTRUCTIONS
- please schedule an appointment to see your PCP  - please schedule an appointment to see GI   - please take medications as prescribed  - please follow low fiber diet until your symptoms completely resolve then follow high fiber diet after that  - please avoid constipation, take laxatives as needed

## 2025-02-04 NOTE — PROGRESS NOTES
GENERAL SURGERY PROGRESS NOTE    Ronn Coronel is a 44 y.o. male with GERD, Fatty liver disease who is admitted for uncomplicated diverticulitis. .                  Subjective:  Patient seen and examine at bedside. He states he feels better with antibiotics. Abdominal Pain controlled with current regimen. Tolerated CLD and passing flatus. Ambulating ad stan. He denies fever, chills, nausea or vomiting.    Pain: 2/10  BM: flatus   Diet: ADULT DIET; Full Liquid  Activity: ad stan    Objective:    Vitals: VITALS:  BP (!) 135/93   Pulse 83   Temp 97.9 °F (36.6 °C) (Oral)   Resp 16   Ht 1.829 m (6')   Wt 117.9 kg (260 lb)   SpO2 93%   BMI 35.26 kg/m²   INTAKE/OUTPUT:    Intake/Output Summary (Last 24 hours) at 2/4/2025 1140  Last data filed at 2/3/2025 1726  Gross per 24 hour   Intake 360 ml   Output --   Net 360 ml       I/O: 02/03 0701 - 02/04 0700  In: 360 [P.O.:360]  Out: -     Labs/Imaging Results:  CBC:   Lab Results   Component Value Date/Time    WBC 9.0 02/03/2025 07:29 AM    RBC 5.06 02/03/2025 07:29 AM    HGB 15.3 02/03/2025 07:29 AM    HCT 46.2 02/03/2025 07:29 AM    MCV 91.3 02/03/2025 07:29 AM    MCH 30.2 02/03/2025 07:29 AM    MCHC 33.1 02/03/2025 07:29 AM    RDW 12.9 02/03/2025 07:29 AM     02/03/2025 07:29 AM    MPV 10.0 02/03/2025 07:29 AM     BMP:    Lab Results   Component Value Date/Time     02/03/2025 07:29 AM    K 4.1 02/03/2025 07:29 AM     02/03/2025 07:29 AM    CO2 25 02/03/2025 07:29 AM    BUN 13 02/03/2025 07:29 AM    CREATININE 1.0 02/03/2025 07:29 AM    CALCIUM 8.9 02/03/2025 07:29 AM    GFRAA >60 06/30/2022 10:38 AM    LABGLOM 84 02/03/2025 07:29 AM    LABGLOM >60 01/16/2024 03:45 PM    GLUCOSE 94 02/03/2025 07:29 AM         IV Fluids:     Scheduled Meds:   enoxaparin, 30 mg, SubCUTAneous, BID    pantoprazole, 40 mg, Oral, QAM AC    piperacillin-tazobactam, 3,375 mg, IntraVENous, Q8H    Physical Exam:  General: Sitting in bed, A&O x 3, no distress.   HEENT: Anicteric

## 2025-02-04 NOTE — PROGRESS NOTES
Outpatient Pharmacy Progress Note for Meds-to-Beds    Total number of Prescriptions Filled: 4    Additional Documentation:  Medication(s) were delivered to the patient's room prior to discharge      Thank you for letting us serve your patients.  [address] [address]    Phone: [phone]    Fax: [phone]

## 2025-02-04 NOTE — PLAN OF CARE
Problem: Discharge Planning  Goal: Discharge to home or other facility with appropriate resources  2/4/2025 0038 by Lisa Vasquez LPN  Outcome: Progressing  2/3/2025 1655 by May Beard RN  Outcome: Progressing     Problem: Pain  Goal: Verbalizes/displays adequate comfort level or baseline comfort level  2/4/2025 0038 by Lisa Vasquez LPN  Outcome: Progressing  2/3/2025 1655 by May Beard RN  Outcome: Progressing

## 2025-02-05 ENCOUNTER — CARE COORDINATION (OUTPATIENT)
Dept: CASE MANAGEMENT | Age: 45
End: 2025-02-05

## 2025-02-05 DIAGNOSIS — K57.92 DIVERTICULITIS: Primary | ICD-10-CM

## 2025-02-05 NOTE — TELEPHONE ENCOUNTER
Spoke with patient. He states he will follow up with Dr. Wilson and then scheduled with Dr. Coffman.

## 2025-02-05 NOTE — CARE COORDINATION
Care Transitions Note    Initial Call - Call within 2 business days of discharge: Yes    Patient Current Location:  Home: [address]  [facility] [address]    Care Transition Nurse contacted patient by telephone to perform post hospital discharge assessment, verified name and  as identifiers. Provided introduction to self, and explanation of Care Transition Nurse role.     Patient: Ronn Coronel    Patient : 1980   MRN: [id number]    Reason for Admission: Sepsis, Diverticulitis   Discharge Date: 25  RURS: Readmission Risk Score: 4  Facility: [facility] 25-25    Last Discharge Facility       Date Complaint Diagnosis Description Type Department Provider    25 Abdominal Pain Acute diverticulitis ... [facility]; Delvin Cantu M...   Was this an external facility discharge? No    Additional needs identified to be addressed with provider   Patient: Ronn Coronel    Patient : 1980   MRN: [id number]    Reason for Admission: Sepsis, Diverticulitis   Facility: [facility] 25-25    Please contact for scheduling of 7 day hospital follow up/ TCM appt due by 25         Method of communication with provider: chart routing pcp clinical pool    Patients top risk factors for readmission: medical condition-as above    Interventions to address risk factors:   Review of patient management of conditions/medications: AVS    Care Summary Note:   Reviewed the following instructions as per pg 7 AVS (confirmed patient has copy):  - please schedule an appointment to see your PCP  - please schedule an appointment to see GI  - please take medications as prescribed  - please follow low fiber diet until your symptoms completely resolve then follow high fiber diet after that  - please avoid constipation, take laxatives as needed    Patient v/u to above. Reports feeling better, not 100% yet though. Denies abd pain/bloating/cramping, n/v, fever, ac distress.

## 2025-02-07 NOTE — DISCHARGE SUMMARY
as: Tessalon Perles     dicyclomine 10 MG capsule  Commonly known as: BENTYL               Where to Get Your Medications        These medications were sent to [address] - P [phone] - F [phone]  [address] [place] [address]      Phone: [phone]   ciprofloxacin 500 MG tablet  metroNIDAZOLE 500 MG tablet  oxyCODONE 5 MG immediate release tablet  polyethylene glycol 17 g packet         Objective Findings at Discharge:       BMP/CBC  No results for input(s): \"NA\", \"K\", \"CL\", \"CO2\", \"BUN\", \"CREATININE\", \"GLU\", \"WBC\", \"HCT\", \"PLT\" in the last 72 hours.    Invalid input(s): \"HEMOGLOBIN\"    IMAGING:      Additional Information: Patient seen and examined day of discharge. For more information regarding patient's care please contact [facility] medical records [phone]    Discharge Time of 35 minutes    Electronically signed by Darvin Izquierdo MD on 2/6/2025 at 9:22 PM

## 2025-02-09 LAB
MICROORGANISM SPEC CULT: NORMAL
MICROORGANISM SPEC CULT: NORMAL
SERVICE CMNT-IMP: NORMAL
SERVICE CMNT-IMP: NORMAL
SPECIMEN DESCRIPTION: NORMAL
SPECIMEN DESCRIPTION: NORMAL

## 2025-02-11 ENCOUNTER — CARE COORDINATION (OUTPATIENT)
Dept: CASE MANAGEMENT | Age: 45
End: 2025-02-11

## 2025-02-11 NOTE — CARE COORDINATION
Care Transitions Note    Follow Up Call      Reason for Admission: Sepsis, Diverticulitis     Attempted to reach patient for transitions of care follow up.  Unable to reach patient.      Outreach Attempts:   HIPAA compliant voicemail left for patient.     Follow Up Appointment:       Plan for follow-up call in 2-5 days based on severity of symptoms and risk factors. Plan for next call: symptom management-.  self management-.    Anjelica Carpio LPN

## 2025-02-14 ENCOUNTER — CARE COORDINATION (OUTPATIENT)
Dept: CASE MANAGEMENT | Age: 45
End: 2025-02-14

## 2025-02-14 NOTE — CARE COORDINATION
Care Transitions Note    Follow Up Call       Patient: Ronn Coronel                                Patient : 1980   MRN: [id number]                             Reason for Admission: Sepsis, Diverticulitis   Discharge Date: 25         RURS: Readmission Risk Score: 4  Facility: [facility] 25-25    Patient Current Location:  [place]    Care Transition Nurse contacted patient by telephone. Verified name and  as identifiers.    Additional needs identified to be addressed with provider   No needs identified         Method of communication with provider: none.    Care Summary Note: Reports doing well. Reports completed Cipro, Flagyl as prescribed. Reports daily bms which are loose > soft which he attributes to recent atbs. Patient holding Glycolax and will take if needed. Denies abd pain. Reports occasional abd bloating however improving.  Reports taking Protonix as directed. Reports appetite and fluid intake good and voiding well, qs. Has appt w/ Dr Wilson 25. Denies rx or resource needs.     Plan of care updates since last contact:  Review of patient management of conditions/medications: as above     Medication Review:  No changes since last call.     Remote Patient Monitoring:  No qualifying dx.     Assessments:  Care Transitions Subsequent and Final Call    Schedule Follow Up Appointment with PCP: Declined  Subsequent and Final Calls  Do you have any ongoing symptoms?: Yes  Onset of Patient-reported symptoms: In the past 7 days  Patient-reported symptoms: Other  Interventions for patient-reported symptoms: Other  Have your medications changed?: No  Do you have any questions related to your medications?: No  Do you currently have any active services?: No  Do you have any needs or concerns that I can assist you with?: No  Identified Barriers: Other  Care Transitions Interventions   Home Care Waiver: Declined        Transportation Support: Declined     Registered Dietician: Declined DME Assistance:

## 2025-02-20 ENCOUNTER — OFFICE VISIT (OUTPATIENT)
Dept: SURGERY | Age: 45
End: 2025-02-20
Payer: COMMERCIAL

## 2025-02-20 VITALS
BODY MASS INDEX: 35.21 KG/M2 | HEART RATE: 70 BPM | WEIGHT: 260 LBS | HEIGHT: 72 IN | SYSTOLIC BLOOD PRESSURE: 134 MMHG | OXYGEN SATURATION: 96 % | DIASTOLIC BLOOD PRESSURE: 86 MMHG

## 2025-02-20 DIAGNOSIS — K40.90 NON-RECURRENT UNILATERAL INGUINAL HERNIA WITHOUT OBSTRUCTION OR GANGRENE: ICD-10-CM

## 2025-02-20 DIAGNOSIS — K57.92 DIVERTICULITIS: Primary | ICD-10-CM

## 2025-02-20 PROCEDURE — 99214 OFFICE O/P EST MOD 30 MIN: CPT | Performed by: SURGERY

## 2025-02-20 ASSESSMENT — ENCOUNTER SYMPTOMS
SORE THROAT: 0
BACK PAIN: 0
COLOR CHANGE: 0
STRIDOR: 0
EYE ITCHING: 0
CHOKING: 0
CONSTIPATION: 0
ABDOMINAL PAIN: 1
PHOTOPHOBIA: 0
RECTAL PAIN: 0
APNEA: 0
ANAL BLEEDING: 0
EYE REDNESS: 0

## 2025-02-20 NOTE — PROGRESS NOTES
guardian, if applicable) and other individuals in attendance at the appointment consented to the use of AI, including the recording.

## 2025-02-26 ENCOUNTER — CARE COORDINATION (OUTPATIENT)
Dept: CASE MANAGEMENT | Age: 45
End: 2025-02-26

## 2025-02-26 NOTE — CARE COORDINATION
Care Transitions Note    Follow Up Call     Patient: Ronn Coronel                                Patient : 1980   MRN: [id number]                             Reason for Admission: Sepsis, Diverticulitis   Discharge Date: 25         RURS: Readmission Risk Score: 4  Facility: [facility] 25-25    Attempt to reach for Care Transition follow up call unsuccessful.    Outreach Attempts:   HIPAA compliant voicemail left for patient.     Follow Up Appointment:   Attended 25 appt w/ Dr Wilson--no new rx.     Plan for follow-up on next business day.  based on severity of symptoms and risk factors. Plan for next call:  f/u Dr Wilson appt-- any rx changes?, bm?, needs?     Rosy Main RN

## 2025-02-27 ENCOUNTER — CARE COORDINATION (OUTPATIENT)
Dept: CASE MANAGEMENT | Age: 45
End: 2025-02-27

## 2025-02-27 NOTE — CARE COORDINATION
Care Transitions Note    Follow Up Call     Patient: Ronn Coronel                                Patient : 1980   MRN: [id number]                             Reason for Admission: Sepsis, Diverticulitis   Discharge Date: 25         RURS: Readmission Risk Score: 4  Facility: [facility] 25-25    2nd unsuccessful attempt to reach for Care Transition follow up call. HIPAA compliant message left requesting call back.  CT program closed per protocol, no further outreach scheduled.     Follow Up Appointment: none noted    Rosy Main RN

## 2025-04-29 RX ORDER — PANTOPRAZOLE SODIUM 40 MG/1
40 TABLET, DELAYED RELEASE ORAL
Qty: 90 TABLET | Refills: 1 | OUTPATIENT
Start: 2025-04-29

## 2025-06-05 ENCOUNTER — OFFICE VISIT (OUTPATIENT)
Dept: FAMILY MEDICINE CLINIC | Age: 45
End: 2025-06-05

## 2025-06-05 VITALS
HEIGHT: 72 IN | OXYGEN SATURATION: 96 % | WEIGHT: 270 LBS | DIASTOLIC BLOOD PRESSURE: 80 MMHG | SYSTOLIC BLOOD PRESSURE: 142 MMHG | RESPIRATION RATE: 18 BRPM | BODY MASS INDEX: 36.57 KG/M2 | HEART RATE: 76 BPM

## 2025-06-05 DIAGNOSIS — K21.9 GASTROESOPHAGEAL REFLUX DISEASE WITHOUT ESOPHAGITIS: ICD-10-CM

## 2025-06-05 DIAGNOSIS — Z13.1 SCREENING FOR DIABETES MELLITUS (DM): ICD-10-CM

## 2025-06-05 DIAGNOSIS — Z13.220 SCREENING CHOLESTEROL LEVEL: ICD-10-CM

## 2025-06-05 DIAGNOSIS — E66.812 CLASS 2 SEVERE OBESITY DUE TO EXCESS CALORIES WITH SERIOUS COMORBIDITY AND BODY MASS INDEX (BMI) OF 36.0 TO 36.9 IN ADULT (HCC): Primary | ICD-10-CM

## 2025-06-05 DIAGNOSIS — R03.0 BORDERLINE HYPERTENSION: ICD-10-CM

## 2025-06-05 DIAGNOSIS — E66.01 CLASS 2 SEVERE OBESITY DUE TO EXCESS CALORIES WITH SERIOUS COMORBIDITY AND BODY MASS INDEX (BMI) OF 36.0 TO 36.9 IN ADULT (HCC): Primary | ICD-10-CM

## 2025-06-05 RX ORDER — ESOMEPRAZOLE MAGNESIUM 40 MG/1
40 CAPSULE, DELAYED RELEASE ORAL
Qty: 90 CAPSULE | Refills: 1 | Status: SHIPPED | OUTPATIENT
Start: 2025-06-05

## 2025-06-05 RX ORDER — LOSARTAN POTASSIUM 25 MG/1
25 TABLET ORAL DAILY
Qty: 90 TABLET | Refills: 1 | Status: SHIPPED | OUTPATIENT
Start: 2025-06-05

## 2025-06-05 RX ORDER — PANTOPRAZOLE SODIUM 40 MG/1
40 TABLET, DELAYED RELEASE ORAL
Qty: 90 TABLET | Refills: 1 | Status: SHIPPED | OUTPATIENT
Start: 2025-06-05 | End: 2025-06-05 | Stop reason: ALTCHOICE

## 2025-06-05 ASSESSMENT — PATIENT HEALTH QUESTIONNAIRE - PHQ9
SUM OF ALL RESPONSES TO PHQ QUESTIONS 1-9: 0
1. LITTLE INTEREST OR PLEASURE IN DOING THINGS: NOT AT ALL
2. FEELING DOWN, DEPRESSED OR HOPELESS: NOT AT ALL

## 2025-06-05 ASSESSMENT — ENCOUNTER SYMPTOMS
SHORTNESS OF BREATH: 0
ABDOMINAL PAIN: 0

## 2025-06-05 NOTE — PROGRESS NOTES
Psychiatric:         Mood and Affect: Mood normal.         Thought Content: Thought content normal.         The patient indicates understanding of of issues discussed today and agrees with the plan  I reviewed the patient's past medical, family and and social history.  The patient was offered an After Visit Summary sheet that lists all of their medications with directions, allergies, orders placed during this encounter, immunization dates, and follow up instructions.  The information is also available in My Chart.    Follow up:  Return for 4-6 week recheck blood pressure / GERD.    Electronically Signed by Maira De Santiago PA-C

## (undated) DEVICE — SNARE ENDOSCP CLD 230 CM 10 MM 2.3 MM ROTATABLE LESIONHUNTER

## (undated) DEVICE — FORCEPS BX L240CM JAW DIA2.8MM L CAP W/ NDL MIC MESH TOOTH

## (undated) DEVICE — ENDOSCOPY KIT: Brand: MEDLINE INDUSTRIES, INC.